# Patient Record
Sex: MALE | Race: WHITE | NOT HISPANIC OR LATINO
[De-identification: names, ages, dates, MRNs, and addresses within clinical notes are randomized per-mention and may not be internally consistent; named-entity substitution may affect disease eponyms.]

---

## 2017-03-07 ENCOUNTER — APPOINTMENT (OUTPATIENT)
Dept: OTOLARYNGOLOGY | Facility: CLINIC | Age: 82
End: 2017-03-07

## 2017-03-07 VITALS
HEART RATE: 88 BPM | SYSTOLIC BLOOD PRESSURE: 143 MMHG | WEIGHT: 169 LBS | BODY MASS INDEX: 26.53 KG/M2 | DIASTOLIC BLOOD PRESSURE: 87 MMHG | HEIGHT: 67 IN

## 2017-03-07 DIAGNOSIS — Z87.891 PERSONAL HISTORY OF NICOTINE DEPENDENCE: ICD-10-CM

## 2017-03-07 DIAGNOSIS — Z86.79 PERSONAL HISTORY OF OTHER DISEASES OF THE CIRCULATORY SYSTEM: ICD-10-CM

## 2017-03-07 RX ORDER — HYDROCHLOROTHIAZIDE 12.5 MG/1
12.5 CAPSULE ORAL
Refills: 0 | Status: ACTIVE | COMMUNITY

## 2017-03-07 RX ORDER — HYDROCHLOROTHIAZIDE 12.5 MG/1
12.5 CAPSULE ORAL
Qty: 90 | Refills: 0 | Status: DISCONTINUED | COMMUNITY
Start: 2016-07-29

## 2017-03-07 RX ORDER — SPIRONOLACTONE 25 MG/1
25 TABLET ORAL
Qty: 90 | Refills: 0 | Status: ACTIVE | COMMUNITY
Start: 2017-02-06

## 2017-03-16 ENCOUNTER — MESSAGE (OUTPATIENT)
Age: 82
End: 2017-03-16

## 2017-03-28 ENCOUNTER — OUTPATIENT (OUTPATIENT)
Dept: OUTPATIENT SERVICES | Facility: HOSPITAL | Age: 82
LOS: 1 days | End: 2017-03-28
Payer: MEDICARE

## 2017-03-28 VITALS
TEMPERATURE: 99 F | RESPIRATION RATE: 16 BRPM | SYSTOLIC BLOOD PRESSURE: 146 MMHG | HEIGHT: 64 IN | WEIGHT: 169.98 LBS | HEART RATE: 76 BPM | DIASTOLIC BLOOD PRESSURE: 70 MMHG

## 2017-03-28 DIAGNOSIS — Z90.49 ACQUIRED ABSENCE OF OTHER SPECIFIED PARTS OF DIGESTIVE TRACT: Chronic | ICD-10-CM

## 2017-03-28 DIAGNOSIS — C44.91 BASAL CELL CARCINOMA OF SKIN, UNSPECIFIED: ICD-10-CM

## 2017-03-28 DIAGNOSIS — C44.219 BASAL CELL CARCINOMA OF SKIN OF LEFT EAR AND EXTERNAL AURICULAR CANAL: ICD-10-CM

## 2017-03-28 DIAGNOSIS — I10 ESSENTIAL (PRIMARY) HYPERTENSION: ICD-10-CM

## 2017-03-28 LAB
BUN SERPL-MCNC: 23 MG/DL — SIGNIFICANT CHANGE UP (ref 7–23)
CALCIUM SERPL-MCNC: 9.7 MG/DL — SIGNIFICANT CHANGE UP (ref 8.4–10.5)
CHLORIDE SERPL-SCNC: 99 MMOL/L — SIGNIFICANT CHANGE UP (ref 98–107)
CO2 SERPL-SCNC: 29 MMOL/L — SIGNIFICANT CHANGE UP (ref 22–31)
CREAT SERPL-MCNC: 1.28 MG/DL — SIGNIFICANT CHANGE UP (ref 0.5–1.3)
GLUCOSE SERPL-MCNC: 82 MG/DL — SIGNIFICANT CHANGE UP (ref 70–99)
HCT VFR BLD CALC: 39.1 % — SIGNIFICANT CHANGE UP (ref 39–50)
HGB BLD-MCNC: 12.7 G/DL — LOW (ref 13–17)
MCHC RBC-ENTMCNC: 27.9 PG — SIGNIFICANT CHANGE UP (ref 27–34)
MCHC RBC-ENTMCNC: 32.5 % — SIGNIFICANT CHANGE UP (ref 32–36)
MCV RBC AUTO: 85.7 FL — SIGNIFICANT CHANGE UP (ref 80–100)
PLATELET # BLD AUTO: 198 K/UL — SIGNIFICANT CHANGE UP (ref 150–400)
PMV BLD: 10.6 FL — SIGNIFICANT CHANGE UP (ref 7–13)
POTASSIUM SERPL-MCNC: 4.5 MMOL/L — SIGNIFICANT CHANGE UP (ref 3.5–5.3)
POTASSIUM SERPL-SCNC: 4.5 MMOL/L — SIGNIFICANT CHANGE UP (ref 3.5–5.3)
RBC # BLD: 4.56 M/UL — SIGNIFICANT CHANGE UP (ref 4.2–5.8)
RBC # FLD: 17.1 % — HIGH (ref 10.3–14.5)
SODIUM SERPL-SCNC: 141 MMOL/L — SIGNIFICANT CHANGE UP (ref 135–145)
WBC # BLD: 5.01 K/UL — SIGNIFICANT CHANGE UP (ref 3.8–10.5)
WBC # FLD AUTO: 5.01 K/UL — SIGNIFICANT CHANGE UP (ref 3.8–10.5)

## 2017-03-28 PROCEDURE — 93010 ELECTROCARDIOGRAM REPORT: CPT

## 2017-03-28 NOTE — H&P PST ADULT - SKIN/BREAST COMMENTS
lesion noted to left ear c/o scabbed lesion to left ear, painful at times, now pain resolved, c/o itching

## 2017-03-28 NOTE — H&P PST ADULT - FAMILY HISTORY
Sibling  Still living? No  Family history of heart disease, Age at diagnosis: Age Unknown  Family history of cancer, Age at diagnosis: Age Unknown

## 2017-03-28 NOTE — H&P PST ADULT - PROBLEM SELECTOR PLAN 1
Pt. is scheduled for excision of left ear canal tumor with skin graft reconstruction, possible local flap for ear reconstruction on 4/5/17.  Preoperative instructions reviewed, pt. verbalized understanding.  Preop Famotidine provided.  Lab results pending, EKG done.  Pt. instructed to obtain medical clearance by surgeon, please obtain copy of clearance for PST chart Pt. is scheduled for excision of left ear canal tumor with skin graft reconstruction, possible local flap for ear reconstruction on 4/5/17.  Preoperative instructions reviewed, pt. verbalized understanding.  Preop Famotidine provided.  Lab results pending, EKG done.  Pt. instructed to obtain medical clearance by surgeon, please obtain copy of clearance for PST chart.  Please request copy of comparison EKG from PMD

## 2017-03-28 NOTE — H&P PST ADULT - PROBLEM SELECTOR PLAN 2
Pt. instructed to continue Pt. instructed to continue HCTZ and Spironolactone as directed, but to hold on the day of surgery

## 2017-03-28 NOTE — H&P PST ADULT - ASSESSMENT
85 y/o male recently diagnosed with basal cell carcinoma is scheduled for excision of left ear canal tumor with skin graft reconstruction, possible local flap for ear reconstruction on 4/5/17

## 2017-03-28 NOTE — H&P PST ADULT - NSANTHOSAYNRD_GEN_A_CORE
No. GRACIE screening performed.  STOP BANG Legend: 0-2 = LOW Risk; 3-4 = INTERMEDIATE Risk; 5-8 = HIGH Risk

## 2017-03-28 NOTE — H&P PST ADULT - ABILITY TO HEAR (WITH HEARING AID OR HEARING APPLIANCE IF NORMALLY USED):
Mildly to Moderately Impaired: difficulty hearing in some environments or speaker may need to increase volume or speak distinctly/B/L hearing loss

## 2017-03-28 NOTE — H&P PST ADULT - HISTORY OF PRESENT ILLNESS
85 y/o male recently diagnosed with skin cancer. 85 y/o male recently diagnosed with basal cell carcinoma in the left ear presents to PAST today for presurgical evaluation.  He complained of left ear pain for which he went to see his doctor.  He was referred to a dermatologist and biopsy confirmed basal cell carcinoma.  He is scheduled now for excision of left ear canal tumor with skin graft reconstruction, possible local flap for ear reconstruction on 4/5/17.

## 2017-03-28 NOTE — H&P PST ADULT - NEGATIVE NEUROLOGICAL SYMPTOMS
no paresthesias/no generalized seizures/no headache/no weakness/no difficulty walking/no syncope/no focal seizures

## 2017-03-28 NOTE — H&P PST ADULT - PRO PAIN LIFE ADAPT
inability to concentrate/inability to sleep/decreased appetite/decreased activity level/inability to work

## 2017-03-28 NOTE — H&P PST ADULT - PMH
Basal cell carcinoma    Colon cancer  Dx'd 2007, s/p colon resection and chemotherapy  Hypertension Basal cell carcinoma    Colon cancer  Dx'd 2007, s/p colon resection and chemotherapy  [place] (hard of hearing)    Hypertension

## 2017-04-03 ENCOUNTER — CXD DOCUMENT (OUTPATIENT)
Age: 82
End: 2017-04-03

## 2017-04-05 ENCOUNTER — APPOINTMENT (OUTPATIENT)
Dept: OTOLARYNGOLOGY | Facility: HOSPITAL | Age: 82
End: 2017-04-05

## 2017-07-25 ENCOUNTER — EMERGENCY (EMERGENCY)
Facility: HOSPITAL | Age: 82
LOS: 1 days | Discharge: HOME | End: 2017-07-25
Attending: EMERGENCY MEDICINE | Admitting: EMERGENCY MEDICINE
Payer: MEDICARE

## 2017-07-25 VITALS
TEMPERATURE: 98 F | DIASTOLIC BLOOD PRESSURE: 79 MMHG | SYSTOLIC BLOOD PRESSURE: 143 MMHG | OXYGEN SATURATION: 100 % | HEART RATE: 80 BPM | RESPIRATION RATE: 18 BRPM

## 2017-07-25 DIAGNOSIS — Z90.49 ACQUIRED ABSENCE OF OTHER SPECIFIED PARTS OF DIGESTIVE TRACT: Chronic | ICD-10-CM

## 2017-07-25 PROCEDURE — 99284 EMERGENCY DEPT VISIT MOD MDM: CPT

## 2017-07-25 RX ORDER — SPIRONOLACTONE 25 MG/1
1 TABLET, FILM COATED ORAL
Qty: 30 | Refills: 0 | OUTPATIENT
Start: 2017-07-25

## 2017-07-25 RX ORDER — SPIRONOLACTONE 25 MG/1
1 TABLET, FILM COATED ORAL
Qty: 0 | Refills: 0 | COMMUNITY

## 2017-07-25 NOTE — ED PROVIDER NOTE - MEDICAL DECISION MAKING DETAILS
Discussed at length. Appears he never saw PMD. Pt waited in waiting room 2 times and Rx was not phoned in. States taking  spironolactone for years. Denies HX of hyperkalemia. Will sent Rx for spironolactone once a night. Instructed to f/u with PMD.

## 2017-07-25 NOTE — ED ADULT TRIAGE NOTE - CHIEF COMPLAINT QUOTE
Pt states he has been unable to have doctor refill his Spironolactone 25mg prescription. States he went to his doctor's office 2 times last week and asked nurse to have doctor refill the prescription but doctor has not done it. Last took medication today and now has no more left.

## 2017-07-25 NOTE — ED PROVIDER NOTE - PMH
Basal cell carcinoma    Colon cancer  Dx'd 2007, s/p colon resection and chemotherapy  [place] (hard of hearing)    Hypertension

## 2017-07-25 NOTE — ED PROVIDER NOTE - OBJECTIVE STATEMENT
84 y/o male with PMHx of basal cell carcinoma, colon cancer, hard of hearing, and HTN, presents to the ED for spironolactone refill. Pt went to the PMD, twice, for a medication refill, but was not able to see the doctor in person. Reports requesting the medication refill to be phones in, but it was not done. Presents to the ED for refill of the medication due to dissatisfaction with the PMD. Denies fever, chills, and any other complaints.

## 2017-09-25 ENCOUNTER — APPOINTMENT (OUTPATIENT)
Dept: UROLOGY | Facility: CLINIC | Age: 82
End: 2017-09-25
Payer: MEDICARE

## 2017-09-25 VITALS
SYSTOLIC BLOOD PRESSURE: 140 MMHG | OXYGEN SATURATION: 97 % | DIASTOLIC BLOOD PRESSURE: 70 MMHG | HEIGHT: 67 IN | WEIGHT: 172 LBS | BODY MASS INDEX: 27 KG/M2 | HEART RATE: 97 BPM

## 2017-09-25 DIAGNOSIS — N20.0 CALCULUS OF KIDNEY: ICD-10-CM

## 2017-09-25 PROCEDURE — 99213 OFFICE O/P EST LOW 20 MIN: CPT

## 2017-09-25 RX ORDER — ACETAMINOPHEN 325 MG/1
TABLET, FILM COATED ORAL
Refills: 0 | Status: ACTIVE | COMMUNITY

## 2017-09-27 LAB
BACTERIA UR CULT: NORMAL
CORE LAB FLUID CYTOLOGY: NORMAL

## 2017-09-28 ENCOUNTER — FORM ENCOUNTER (OUTPATIENT)
Age: 82
End: 2017-09-28

## 2017-09-29 ENCOUNTER — OUTPATIENT (OUTPATIENT)
Dept: OUTPATIENT SERVICES | Facility: HOSPITAL | Age: 82
LOS: 1 days | End: 2017-09-29
Payer: COMMERCIAL

## 2017-09-29 ENCOUNTER — APPOINTMENT (OUTPATIENT)
Dept: CT IMAGING | Facility: CLINIC | Age: 82
End: 2017-09-29
Payer: MEDICARE

## 2017-09-29 DIAGNOSIS — Z00.8 ENCOUNTER FOR OTHER GENERAL EXAMINATION: ICD-10-CM

## 2017-09-29 DIAGNOSIS — Z90.49 ACQUIRED ABSENCE OF OTHER SPECIFIED PARTS OF DIGESTIVE TRACT: Chronic | ICD-10-CM

## 2017-09-29 PROCEDURE — 74178 CT ABD&PLV WO CNTR FLWD CNTR: CPT | Mod: 26

## 2017-09-29 PROCEDURE — 74178 CT ABD&PLV WO CNTR FLWD CNTR: CPT

## 2017-09-29 PROCEDURE — 82565 ASSAY OF CREATININE: CPT

## 2017-10-05 ENCOUNTER — APPOINTMENT (OUTPATIENT)
Dept: UROLOGY | Facility: CLINIC | Age: 82
End: 2017-10-05
Payer: MEDICARE

## 2017-10-05 VITALS
BODY MASS INDEX: 27 KG/M2 | HEIGHT: 67 IN | TEMPERATURE: 98.7 F | WEIGHT: 172 LBS | SYSTOLIC BLOOD PRESSURE: 120 MMHG | HEART RATE: 95 BPM | OXYGEN SATURATION: 97 % | DIASTOLIC BLOOD PRESSURE: 70 MMHG

## 2017-10-05 PROCEDURE — 52000 CYSTOURETHROSCOPY: CPT

## 2017-10-20 ENCOUNTER — OUTPATIENT (OUTPATIENT)
Dept: OUTPATIENT SERVICES | Facility: HOSPITAL | Age: 82
LOS: 1 days | End: 2017-10-20
Payer: COMMERCIAL

## 2017-10-20 VITALS
OXYGEN SATURATION: 99 % | HEIGHT: 67 IN | DIASTOLIC BLOOD PRESSURE: 81 MMHG | TEMPERATURE: 98 F | SYSTOLIC BLOOD PRESSURE: 158 MMHG | WEIGHT: 167.99 LBS | HEART RATE: 71 BPM | RESPIRATION RATE: 16 BRPM

## 2017-10-20 DIAGNOSIS — Z90.49 ACQUIRED ABSENCE OF OTHER SPECIFIED PARTS OF DIGESTIVE TRACT: Chronic | ICD-10-CM

## 2017-10-20 DIAGNOSIS — D49.4 NEOPLASM OF UNSPECIFIED BEHAVIOR OF BLADDER: ICD-10-CM

## 2017-10-20 DIAGNOSIS — Z01.818 ENCOUNTER FOR OTHER PREPROCEDURAL EXAMINATION: ICD-10-CM

## 2017-10-20 DIAGNOSIS — R31.0 GROSS HEMATURIA: ICD-10-CM

## 2017-10-20 LAB
ANION GAP SERPL CALC-SCNC: 18 MMOL/L — HIGH (ref 5–17)
BUN SERPL-MCNC: 22 MG/DL — SIGNIFICANT CHANGE UP (ref 7–23)
CALCIUM SERPL-MCNC: 10.8 MG/DL — HIGH (ref 8.4–10.5)
CHLORIDE SERPL-SCNC: 104 MMOL/L — SIGNIFICANT CHANGE UP (ref 96–108)
CO2 SERPL-SCNC: 23 MMOL/L — SIGNIFICANT CHANGE UP (ref 22–31)
CREAT SERPL-MCNC: 1.41 MG/DL — HIGH (ref 0.5–1.3)
GLUCOSE SERPL-MCNC: 103 MG/DL — HIGH (ref 70–99)
HCT VFR BLD CALC: 37.4 % — LOW (ref 39–50)
HGB BLD-MCNC: 12.4 G/DL — LOW (ref 13–17)
MCHC RBC-ENTMCNC: 28.4 PG — SIGNIFICANT CHANGE UP (ref 27–34)
MCHC RBC-ENTMCNC: 33.2 GM/DL — SIGNIFICANT CHANGE UP (ref 32–36)
MCV RBC AUTO: 85.6 FL — SIGNIFICANT CHANGE UP (ref 80–100)
PLATELET # BLD AUTO: 183 K/UL — SIGNIFICANT CHANGE UP (ref 150–400)
POTASSIUM SERPL-MCNC: 4.4 MMOL/L — SIGNIFICANT CHANGE UP (ref 3.5–5.3)
POTASSIUM SERPL-SCNC: 4.4 MMOL/L — SIGNIFICANT CHANGE UP (ref 3.5–5.3)
RBC # BLD: 4.37 M/UL — SIGNIFICANT CHANGE UP (ref 4.2–5.8)
RBC # FLD: 16.4 % — HIGH (ref 10.3–14.5)
SODIUM SERPL-SCNC: 145 MMOL/L — SIGNIFICANT CHANGE UP (ref 135–145)
WBC # BLD: 5.45 K/UL — SIGNIFICANT CHANGE UP (ref 3.8–10.5)
WBC # FLD AUTO: 5.45 K/UL — SIGNIFICANT CHANGE UP (ref 3.8–10.5)

## 2017-10-20 RX ORDER — CEFAZOLIN SODIUM 1 G
2000 VIAL (EA) INJECTION ONCE
Qty: 0 | Refills: 0 | Status: DISCONTINUED | OUTPATIENT
Start: 2017-10-24 | End: 2017-10-25

## 2017-10-20 RX ORDER — GARLIC 1000 MG
1 CAPSULE ORAL
Qty: 0 | Refills: 0 | COMMUNITY

## 2017-10-20 NOTE — H&P PST ADULT - MUSCULOSKELETAL
details… detailed exam ROM intact/normal strength no joint erythema/no joint swelling/no calf tenderness/no joint warmth

## 2017-10-20 NOTE — H&P PST ADULT - NEGATIVE NEUROLOGICAL SYMPTOMS
no weakness/no paresthesias/no generalized seizures/no syncope/no focal seizures/no headache/no difficulty walking

## 2017-10-20 NOTE — H&P PST ADULT - NEUROLOGICAL DETAILS
alert and oriented x 3/sensation intact/normal strength/responds to verbal commands no spontaneous movement/alert and oriented x 3/normal strength

## 2017-10-20 NOTE — H&P PST ADULT - HISTORY OF PRESENT ILLNESS
85 y/o male recently diagnosed with basal cell carcinoma in the left ear presents to PAST today for presurgical evaluation.  He complained of left ear pain for which he went to see his doctor.  He was referred to a dermatologist and biopsy confirmed basal cell carcinoma.  He is scheduled now for excision of left ear canal tumor with skin graft reconstruction, possible local flap for ear reconstruction on 4/5/17. 85 year old male with PMH of HTN, colon cancer s/p resection, basal cell carcinoma in the left ear with complaint of hematuria planned for TURBT - Bipolar, left flexible ureteroscopy, biopsy.     basal cell carcinoma in the left ear: had planned for excision of left ear canal tumor 4/2017, However surgery was cancelled by patient.

## 2017-10-20 NOTE — H&P PST ADULT - CONSTITUTIONAL DETAILS
no distress/well-groomed/well-nourished/well-developed no distress/well-nourished/well-developed/well-groomed/[place]

## 2017-10-20 NOTE — H&P PST ADULT - PROBLEM SELECTOR PLAN 1
planned for TURBT - Bipolar, left flexible ureteroscopy, biopsy.   PST labs send  preprocedure instructions discussed

## 2017-10-20 NOTE — H&P PST ADULT - SKIN/BREAST COMMENTS
c/o scabbed lesion to left ear, painful at times, now pain resolved with scabbed lesion to left ear.

## 2017-10-20 NOTE — H&P PST ADULT - PMH
Basal cell carcinoma    Colon cancer  Dx'd 2007, s/p colon resection and chemotherapy  [place] (hard of hearing)    Hypertension Basal cell carcinoma    Bladder tumor    Colon cancer  Dx'd 2007, s/p colon resection and chemotherapy  Hematuria    [place] (hard of hearing)    Hypertension

## 2017-10-24 ENCOUNTER — INPATIENT (INPATIENT)
Facility: HOSPITAL | Age: 82
LOS: 0 days | Discharge: LEFT BEFORE DISCHARGE | DRG: 669 | End: 2017-10-25
Attending: UROLOGY | Admitting: UROLOGY
Payer: COMMERCIAL

## 2017-10-24 ENCOUNTER — APPOINTMENT (OUTPATIENT)
Dept: UROLOGY | Facility: AMBULATORY SURGERY CENTER | Age: 82
End: 2017-10-24

## 2017-10-24 ENCOUNTER — TRANSCRIPTION ENCOUNTER (OUTPATIENT)
Age: 82
End: 2017-10-24

## 2017-10-24 ENCOUNTER — RESULT REVIEW (OUTPATIENT)
Age: 82
End: 2017-10-24

## 2017-10-24 VITALS
HEIGHT: 67 IN | WEIGHT: 167.99 LBS | DIASTOLIC BLOOD PRESSURE: 80 MMHG | SYSTOLIC BLOOD PRESSURE: 146 MMHG | HEART RATE: 81 BPM | OXYGEN SATURATION: 95 % | TEMPERATURE: 98 F | RESPIRATION RATE: 18 BRPM

## 2017-10-24 DIAGNOSIS — R31.0 GROSS HEMATURIA: ICD-10-CM

## 2017-10-24 DIAGNOSIS — Z90.49 ACQUIRED ABSENCE OF OTHER SPECIFIED PARTS OF DIGESTIVE TRACT: Chronic | ICD-10-CM

## 2017-10-24 LAB
ANION GAP SERPL CALC-SCNC: 13 MMOL/L — SIGNIFICANT CHANGE UP (ref 5–17)
BLD GP AB SCN SERPL QL: NEGATIVE — SIGNIFICANT CHANGE UP
BUN SERPL-MCNC: 28 MG/DL — HIGH (ref 7–23)
CALCIUM SERPL-MCNC: 8.7 MG/DL — SIGNIFICANT CHANGE UP (ref 8.4–10.5)
CHLORIDE SERPL-SCNC: 106 MMOL/L — SIGNIFICANT CHANGE UP (ref 96–108)
CO2 SERPL-SCNC: 24 MMOL/L — SIGNIFICANT CHANGE UP (ref 22–31)
CREAT SERPL-MCNC: 1.41 MG/DL — HIGH (ref 0.5–1.3)
GLUCOSE SERPL-MCNC: 111 MG/DL — HIGH (ref 70–99)
HCT VFR BLD CALC: 37 % — LOW (ref 39–50)
HGB BLD-MCNC: 12 G/DL — LOW (ref 13–17)
MAGNESIUM SERPL-MCNC: 1.9 MG/DL — SIGNIFICANT CHANGE UP (ref 1.6–2.6)
MCHC RBC-ENTMCNC: 28.9 PG — SIGNIFICANT CHANGE UP (ref 27–34)
MCHC RBC-ENTMCNC: 32.6 GM/DL — SIGNIFICANT CHANGE UP (ref 32–36)
MCV RBC AUTO: 88.9 FL — SIGNIFICANT CHANGE UP (ref 80–100)
PHOSPHATE SERPL-MCNC: 3.6 MG/DL — SIGNIFICANT CHANGE UP (ref 2.5–4.5)
PLATELET # BLD AUTO: 158 K/UL — SIGNIFICANT CHANGE UP (ref 150–400)
POTASSIUM SERPL-MCNC: 4.3 MMOL/L — SIGNIFICANT CHANGE UP (ref 3.5–5.3)
POTASSIUM SERPL-SCNC: 4.3 MMOL/L — SIGNIFICANT CHANGE UP (ref 3.5–5.3)
RBC # BLD: 4.16 M/UL — LOW (ref 4.2–5.8)
RBC # FLD: 15.4 % — HIGH (ref 10.3–14.5)
RH IG SCN BLD-IMP: POSITIVE — SIGNIFICANT CHANGE UP
SODIUM SERPL-SCNC: 143 MMOL/L — SIGNIFICANT CHANGE UP (ref 135–145)
TROPONIN T SERPL-MCNC: <0.01 NG/ML — SIGNIFICANT CHANGE UP (ref 0–0.06)
WBC # BLD: 4.3 K/UL — SIGNIFICANT CHANGE UP (ref 3.8–10.5)
WBC # FLD AUTO: 4.3 K/UL — SIGNIFICANT CHANGE UP (ref 3.8–10.5)

## 2017-10-24 PROCEDURE — 85027 COMPLETE CBC AUTOMATED: CPT

## 2017-10-24 PROCEDURE — 88307 TISSUE EXAM BY PATHOLOGIST: CPT | Mod: 26

## 2017-10-24 PROCEDURE — G0463: CPT

## 2017-10-24 PROCEDURE — 80048 BASIC METABOLIC PNL TOTAL CA: CPT

## 2017-10-24 PROCEDURE — 93010 ELECTROCARDIOGRAM REPORT: CPT

## 2017-10-24 PROCEDURE — 76000 FLUOROSCOPY <1 HR PHYS/QHP: CPT

## 2017-10-24 RX ORDER — SENNA PLUS 8.6 MG/1
1 TABLET ORAL AT BEDTIME
Qty: 0 | Refills: 0 | Status: DISCONTINUED | OUTPATIENT
Start: 2017-10-24 | End: 2017-10-25

## 2017-10-24 RX ORDER — OXYCODONE AND ACETAMINOPHEN 5; 325 MG/1; MG/1
2 TABLET ORAL EVERY 6 HOURS
Qty: 0 | Refills: 0 | Status: DISCONTINUED | OUTPATIENT
Start: 2017-10-24 | End: 2017-10-25

## 2017-10-24 RX ORDER — SPIRONOLACTONE 25 MG/1
25 TABLET, FILM COATED ORAL DAILY
Qty: 0 | Refills: 0 | Status: DISCONTINUED | OUTPATIENT
Start: 2017-10-24 | End: 2017-10-25

## 2017-10-24 RX ORDER — HYDROMORPHONE HYDROCHLORIDE 2 MG/ML
0.2 INJECTION INTRAMUSCULAR; INTRAVENOUS; SUBCUTANEOUS
Qty: 0 | Refills: 0 | Status: DISCONTINUED | OUTPATIENT
Start: 2017-10-24 | End: 2017-10-25

## 2017-10-24 RX ORDER — ACETAMINOPHEN 500 MG
650 TABLET ORAL EVERY 6 HOURS
Qty: 0 | Refills: 0 | Status: DISCONTINUED | OUTPATIENT
Start: 2017-10-24 | End: 2017-10-25

## 2017-10-24 RX ORDER — ONDANSETRON 8 MG/1
4 TABLET, FILM COATED ORAL ONCE
Qty: 0 | Refills: 0 | Status: DISCONTINUED | OUTPATIENT
Start: 2017-10-24 | End: 2017-10-25

## 2017-10-24 RX ORDER — SODIUM CHLORIDE 9 MG/ML
3 INJECTION INTRAMUSCULAR; INTRAVENOUS; SUBCUTANEOUS EVERY 8 HOURS
Qty: 0 | Refills: 0 | Status: DISCONTINUED | OUTPATIENT
Start: 2017-10-24 | End: 2017-10-24

## 2017-10-24 RX ORDER — LIDOCAINE 4 G/100G
1 CREAM TOPICAL EVERY 4 HOURS
Qty: 0 | Refills: 0 | Status: DISCONTINUED | OUTPATIENT
Start: 2017-10-24 | End: 2017-10-25

## 2017-10-24 RX ORDER — HEPARIN SODIUM 5000 [USP'U]/ML
5000 INJECTION INTRAVENOUS; SUBCUTANEOUS EVERY 8 HOURS
Qty: 0 | Refills: 0 | Status: DISCONTINUED | OUTPATIENT
Start: 2017-10-24 | End: 2017-10-25

## 2017-10-24 RX ORDER — OXYCODONE AND ACETAMINOPHEN 5; 325 MG/1; MG/1
1 TABLET ORAL EVERY 4 HOURS
Qty: 0 | Refills: 0 | Status: DISCONTINUED | OUTPATIENT
Start: 2017-10-24 | End: 2017-10-25

## 2017-10-24 RX ORDER — DOCUSATE SODIUM 100 MG
100 CAPSULE ORAL THREE TIMES A DAY
Qty: 0 | Refills: 0 | Status: DISCONTINUED | OUTPATIENT
Start: 2017-10-24 | End: 2017-10-25

## 2017-10-24 RX ORDER — LIDOCAINE HCL 20 MG/ML
0.2 VIAL (ML) INJECTION ONCE
Qty: 0 | Refills: 0 | Status: DISCONTINUED | OUTPATIENT
Start: 2017-10-24 | End: 2017-10-24

## 2017-10-24 RX ORDER — SODIUM CHLORIDE 9 MG/ML
1000 INJECTION, SOLUTION INTRAVENOUS
Qty: 0 | Refills: 0 | Status: DISCONTINUED | OUTPATIENT
Start: 2017-10-24 | End: 2017-10-25

## 2017-10-24 RX ORDER — CEFAZOLIN SODIUM 1 G
2000 VIAL (EA) INJECTION EVERY 8 HOURS
Qty: 0 | Refills: 0 | Status: DISCONTINUED | OUTPATIENT
Start: 2017-10-24 | End: 2017-10-25

## 2017-10-24 RX ADMIN — OXYCODONE AND ACETAMINOPHEN 1 TABLET(S): 5; 325 TABLET ORAL at 21:07

## 2017-10-24 RX ADMIN — OXYCODONE AND ACETAMINOPHEN 1 TABLET(S): 5; 325 TABLET ORAL at 21:35

## 2017-10-24 RX ADMIN — Medication 100 MILLIGRAM(S): at 21:08

## 2017-10-24 RX ADMIN — SENNA PLUS 1 TABLET(S): 8.6 TABLET ORAL at 21:06

## 2017-10-24 RX ADMIN — Medication 100 MILLIGRAM(S): at 21:06

## 2017-10-24 NOTE — PROGRESS NOTE ADULT - SUBJECTIVE AND OBJECTIVE BOX
Post op Check    Pt seen and examined resting at bedside without complaints. Pending Cardiac workup for SVT intraoperatively, which reversed when patient was reversed from anesthesia. Pain is controlled. Denies SOB/CP/N/V.     Vital Signs Last 24 Hrs  T(C): 36.4 (24 Oct 2017 18:50), Max: 36.6 (24 Oct 2017 12:30)  T(F): 97.5 (24 Oct 2017 18:50), Max: 97.9 (24 Oct 2017 12:30)  HR: 77 (24 Oct 2017 20:30) (71 - 81)  BP: 169/82 (24 Oct 2017 20:30) (133/69 - 169/82)  BP(mean): 118 (24 Oct 2017 20:30) (109 - 118)  RR: 15 (24 Oct 2017 20:30) (15 - 18)  SpO2: 97% (24 Oct 2017 20:30) (95% - 98%)    I&O's Summary    24 Oct 2017 07:01  -  24 Oct 2017 21:42  --------------------------------------------------------  IN: 75 mL / OUT: 0 mL / NET: 75 mL        Physical Exam  Gen: NAD, A&O x 3  Abd: Soft, NT, ND  Back: no CVA tenderness b/l  : +CBI draining clear red                           12.0   4.3   )-----------( 158      ( 24 Oct 2017 19:14 )             37.0       10-24    143  |  106  |  28<H>  ----------------------------<  111<H>  4.3   |  24  |  1.41<H>    Ca    8.7      24 Oct 2017 19:14  Phos  3.6     10-24  Mg     1.9     10-24

## 2017-10-24 NOTE — PROGRESS NOTE ADULT - ASSESSMENT
A/P: 85y Male s/p cystoscopy, TURBT  -DVT prophylaxis/OOB  -Incentive spirometry  -Strict I&O's  -Analgesia and antiemetics as needed  -Diet  -AM labs  - cbc, bmp, troponin, ekg  - tele monitoring  - Cardiology consult

## 2017-10-24 NOTE — BRIEF OPERATIVE NOTE - PROCEDURE
Retrograde pyelogram  10/24/2017    Active  ANG5  Cystoscopy, with TURBT  10/24/2017    Active  ANG5 <<-----Click on this checkbox to enter Procedure

## 2017-10-25 ENCOUNTER — TRANSCRIPTION ENCOUNTER (OUTPATIENT)
Age: 82
End: 2017-10-25

## 2017-10-25 VITALS
RESPIRATION RATE: 18 BRPM | DIASTOLIC BLOOD PRESSURE: 76 MMHG | OXYGEN SATURATION: 94 % | TEMPERATURE: 99 F | SYSTOLIC BLOOD PRESSURE: 168 MMHG | HEART RATE: 81 BPM

## 2017-10-25 LAB
ANION GAP SERPL CALC-SCNC: 14 MMOL/L — SIGNIFICANT CHANGE UP (ref 5–17)
BUN SERPL-MCNC: 26 MG/DL — HIGH (ref 7–23)
CALCIUM SERPL-MCNC: 8.5 MG/DL — SIGNIFICANT CHANGE UP (ref 8.4–10.5)
CHLORIDE SERPL-SCNC: 103 MMOL/L — SIGNIFICANT CHANGE UP (ref 96–108)
CO2 SERPL-SCNC: 23 MMOL/L — SIGNIFICANT CHANGE UP (ref 22–31)
CREAT SERPL-MCNC: 1.32 MG/DL — HIGH (ref 0.5–1.3)
GLUCOSE SERPL-MCNC: 126 MG/DL — HIGH (ref 70–99)
HCT VFR BLD CALC: 35 % — LOW (ref 39–50)
HGB BLD-MCNC: 12 G/DL — LOW (ref 13–17)
MCHC RBC-ENTMCNC: 30 PG — SIGNIFICANT CHANGE UP (ref 27–34)
MCHC RBC-ENTMCNC: 34.4 GM/DL — SIGNIFICANT CHANGE UP (ref 32–36)
MCV RBC AUTO: 87.3 FL — SIGNIFICANT CHANGE UP (ref 80–100)
PLATELET # BLD AUTO: 162 K/UL — SIGNIFICANT CHANGE UP (ref 150–400)
POTASSIUM SERPL-MCNC: 4.1 MMOL/L — SIGNIFICANT CHANGE UP (ref 3.5–5.3)
POTASSIUM SERPL-SCNC: 4.1 MMOL/L — SIGNIFICANT CHANGE UP (ref 3.5–5.3)
RBC # BLD: 4.01 M/UL — LOW (ref 4.2–5.8)
RBC # FLD: 15.2 % — HIGH (ref 10.3–14.5)
SODIUM SERPL-SCNC: 140 MMOL/L — SIGNIFICANT CHANGE UP (ref 135–145)
TROPONIN T SERPL-MCNC: <0.01 NG/ML — SIGNIFICANT CHANGE UP (ref 0–0.06)
WBC # BLD: 7.4 K/UL — SIGNIFICANT CHANGE UP (ref 3.8–10.5)
WBC # FLD AUTO: 7.4 K/UL — SIGNIFICANT CHANGE UP (ref 3.8–10.5)

## 2017-10-25 RX ORDER — CEPHALEXIN 500 MG
1 CAPSULE ORAL
Qty: 9 | Refills: 0 | OUTPATIENT
Start: 2017-10-25 | End: 2017-10-28

## 2017-10-25 RX ORDER — ACETAMINOPHEN 500 MG
1 TABLET ORAL
Qty: 0 | Refills: 0 | COMMUNITY

## 2017-10-25 RX ORDER — SENNA PLUS 8.6 MG/1
1 TABLET ORAL
Qty: 0 | Refills: 0 | COMMUNITY
Start: 2017-10-25

## 2017-10-25 RX ORDER — DOCUSATE SODIUM 100 MG
1 CAPSULE ORAL
Qty: 0 | Refills: 0 | COMMUNITY
Start: 2017-10-25

## 2017-10-25 RX ADMIN — OXYCODONE AND ACETAMINOPHEN 2 TABLET(S): 5; 325 TABLET ORAL at 20:44

## 2017-10-25 RX ADMIN — Medication 100 MILLIGRAM(S): at 14:24

## 2017-10-25 RX ADMIN — Medication 100 MILLIGRAM(S): at 06:16

## 2017-10-25 RX ADMIN — OXYCODONE AND ACETAMINOPHEN 1 TABLET(S): 5; 325 TABLET ORAL at 02:00

## 2017-10-25 RX ADMIN — OXYCODONE AND ACETAMINOPHEN 1 TABLET(S): 5; 325 TABLET ORAL at 01:15

## 2017-10-25 RX ADMIN — HEPARIN SODIUM 5000 UNIT(S): 5000 INJECTION INTRAVENOUS; SUBCUTANEOUS at 06:19

## 2017-10-25 RX ADMIN — SODIUM CHLORIDE 75 MILLILITER(S): 9 INJECTION, SOLUTION INTRAVENOUS at 09:29

## 2017-10-25 RX ADMIN — SPIRONOLACTONE 25 MILLIGRAM(S): 25 TABLET, FILM COATED ORAL at 06:19

## 2017-10-25 NOTE — PROGRESS NOTE ADULT - SUBJECTIVE AND OBJECTIVE BOX
UROLOGY DAILY PROGRESS NOTE:     Subjective: Patient seen and examined at bedside. No overnight events. Denies chest pain or SOB.       Objective:  Vital signs  T(F): , Max: 97.9 (10-24-17 @ 12:30)  HR: 62 (10-25-17 @ 06:00)  BP: 113/58 (10-25-17 @ 06:00)  SpO2: 100% (10-25-17 @ 06:00)  Wt(kg): --    I&O's Summary    24 Oct 2017 07:01  -  25 Oct 2017 07:00  --------------------------------------------------------  IN: 1250 mL / OUT: 1060 mL / NET: 190 mL    25 Oct 2017 07:01  -  25 Oct 2017 07:20  --------------------------------------------------------  IN: 75 mL / OUT: 100 mL / NET: -25 mL        Gen: NAD  Pulm: No respiratory distress, no subcostal retractions  CV: RRR, no JVD  Abd: Soft, NT, ND  : Tena- clear yellow urine    Labs:  10-25  7.4   / 35.0  /1.32   10-24  4.3   / 37.0  /1.41                           12.0   7.4   )-----------( 162      ( 25 Oct 2017 01:23 )             35.0     10-25    140  |  103  |  26<H>  ----------------------------<  126<H>  4.1   |  23  |  1.32<H>    Ca    8.5      25 Oct 2017 01:23  Phos  3.6     10-24  Mg     1.9     10-24

## 2017-10-25 NOTE — DISCHARGE NOTE ADULT - CONDITIONS AT DISCHARGE
Patient vital signs stable, signing out of hospital against medical advice, pt and pt son (HCP) verbalize understanding risks of leaving AMA.

## 2017-10-25 NOTE — PROVIDER CONTACT NOTE (OTHER) - SITUATION
patient received from PACU A&Ox2 s/p TURP today. patient very agitated and pulling at zimmerman, IVL, and heart monitor. patient was on constant observation in PACU for a short time

## 2017-10-25 NOTE — DISCHARGE NOTE ADULT - HOSPITAL COURSE
86 yo male admitted 10/24 s/p scheduled TURBT. Intraop had SVT, post op stable and assymptomatic and no abnormalities on monitor. cardiac enzymes x2 negative, electrolytes WNL. Dr. Aranda saw patient and recommended hard telemetry monitoring for 24hours and ECHO. Pt is refusing to stay and wants to leave AMA. patient advised to stay, Risks explained to patient and son up to and including death. patient and son signed AMA paperwork, will go home with elsie butler.

## 2017-10-25 NOTE — DISCHARGE NOTE ADULT - ADDITIONAL INSTRUCTIONS
Home with elsie to [place], follow up with Dr. Man as outpatient for zimmerman removal, call for appt.     Follow up with cardio as outpatient, call for appt. ECHO as outpatient

## 2017-10-25 NOTE — ANESTHESIA FOLLOW-UP NOTE - NSINTERVIEW_GEN_ALL_CORE
Received fax from pharmacy that insurance not covering diltiazem; prefers amlodipine or verapamil; please advise   Interviewed and evaluated

## 2017-10-25 NOTE — PROGRESS NOTE ADULT - ASSESSMENT
s/p TURBT, SVTs intra op, neg CE  - Cardio- [facility] following  - Telemetry x 24h  - Cont zimmerman catheter  - OOB  - Reg diet

## 2017-10-25 NOTE — DISCHARGE NOTE ADULT - PLAN OF CARE
pain control Call the office if you have fever greater than 101, difficulty urinating, pain not relieved with pain medication, nausea/vomiting. Home with elsie butler. continue home medication Follow up with cardiology for full work up including echocardiogram.   Information for Dr. Aranda provided

## 2017-10-25 NOTE — DISCHARGE NOTE ADULT - CARE PROVIDER_API CALL
Ashutosh Man), Urology  [address]  [address]  [place] [address]  Phone: [phone]  Fax: [phone]    Ashutosh Aranda), Cardiovascular Disease; Internal Medicine  [address] [address]  Phone: [phone]  Fax: [phone]

## 2017-10-25 NOTE — DISCHARGE NOTE ADULT - MEDICATION SUMMARY - MEDICATIONS TO TAKE
I will START or STAY ON the medications listed below when I get home from the hospital:    spironolactone 25 mg oral tablet  -- 1 tab(s) by mouth once a day  -- Indication: For home medication    Keflex 500 mg oral capsule  -- 1 cap(s) by mouth 3 times a day   -- Finish all this medication unless otherwise directed by prescriber.    -- Indication: For antibiotic    docusate sodium 100 mg oral capsule  -- 1 cap(s) by mouth 3 times a day  -- Indication: For constipation    senna oral tablet  -- 1 tab(s) by mouth once a day (at bedtime)  -- Indication: For constipation

## 2017-10-25 NOTE — PROVIDER CONTACT NOTE (OTHER) - ASSESSMENT
VSS. Oriented to place and self. Agitated and pulling at lines. Screaming "I want to go back, get me out of here"

## 2017-10-27 ENCOUNTER — MEDICATION RENEWAL (OUTPATIENT)
Age: 82
End: 2017-10-27

## 2017-10-27 ENCOUNTER — APPOINTMENT (OUTPATIENT)
Dept: UROLOGY | Facility: CLINIC | Age: 82
End: 2017-10-27

## 2017-10-27 LAB — SURGICAL PATHOLOGY STUDY: SIGNIFICANT CHANGE UP

## 2017-10-30 ENCOUNTER — APPOINTMENT (OUTPATIENT)
Dept: UROLOGY | Facility: CLINIC | Age: 82
End: 2017-10-30
Payer: MEDICARE

## 2017-10-30 VITALS — DIASTOLIC BLOOD PRESSURE: 70 MMHG | SYSTOLIC BLOOD PRESSURE: 120 MMHG | HEART RATE: 73 BPM | OXYGEN SATURATION: 95 %

## 2017-10-30 DIAGNOSIS — Z87.448 PERSONAL HISTORY OF OTHER DISEASES OF URINARY SYSTEM: ICD-10-CM

## 2017-10-30 DIAGNOSIS — C67.2 MALIGNANT NEOPLASM OF LATERAL WALL OF BLADDER: ICD-10-CM

## 2017-10-30 PROCEDURE — 99214 OFFICE O/P EST MOD 30 MIN: CPT

## 2017-12-19 PROCEDURE — 88307 TISSUE EXAM BY PATHOLOGIST: CPT

## 2017-12-19 PROCEDURE — C1758: CPT

## 2017-12-19 PROCEDURE — 84484 ASSAY OF TROPONIN QUANT: CPT

## 2017-12-19 PROCEDURE — 86901 BLOOD TYPING SEROLOGIC RH(D): CPT

## 2017-12-19 PROCEDURE — 83735 ASSAY OF MAGNESIUM: CPT

## 2017-12-19 PROCEDURE — C1769: CPT

## 2017-12-19 PROCEDURE — 93005 ELECTROCARDIOGRAM TRACING: CPT

## 2017-12-19 PROCEDURE — 86900 BLOOD TYPING SEROLOGIC ABO: CPT

## 2017-12-19 PROCEDURE — 86850 RBC ANTIBODY SCREEN: CPT

## 2017-12-19 PROCEDURE — 84100 ASSAY OF PHOSPHORUS: CPT

## 2017-12-19 PROCEDURE — 80048 BASIC METABOLIC PNL TOTAL CA: CPT

## 2017-12-19 PROCEDURE — 85027 COMPLETE CBC AUTOMATED: CPT

## 2018-01-22 ENCOUNTER — APPOINTMENT (OUTPATIENT)
Dept: UROLOGY | Facility: CLINIC | Age: 83
End: 2018-01-22

## 2018-05-15 ENCOUNTER — APPOINTMENT (OUTPATIENT)
Dept: OTOLARYNGOLOGY | Facility: CLINIC | Age: 83
End: 2018-05-15
Payer: MEDICARE

## 2018-05-15 VITALS
SYSTOLIC BLOOD PRESSURE: 158 MMHG | HEART RATE: 79 BPM | DIASTOLIC BLOOD PRESSURE: 74 MMHG | RESPIRATION RATE: 18 BRPM | WEIGHT: 168 LBS | BODY MASS INDEX: 26.37 KG/M2 | HEIGHT: 67 IN

## 2018-05-15 PROCEDURE — 99213 OFFICE O/P EST LOW 20 MIN: CPT | Mod: 25

## 2018-05-15 PROCEDURE — 69210 REMOVE IMPACTED EAR WAX UNI: CPT

## 2018-05-22 ENCOUNTER — MESSAGE (OUTPATIENT)
Age: 83
End: 2018-05-22

## 2018-06-05 ENCOUNTER — APPOINTMENT (OUTPATIENT)
Dept: OTOLARYNGOLOGY | Facility: CLINIC | Age: 83
End: 2018-06-05
Payer: MEDICARE

## 2018-06-05 VITALS
WEIGHT: 168 LBS | SYSTOLIC BLOOD PRESSURE: 146 MMHG | HEART RATE: 87 BPM | BODY MASS INDEX: 26.37 KG/M2 | RESPIRATION RATE: 18 BRPM | DIASTOLIC BLOOD PRESSURE: 78 MMHG | HEIGHT: 67 IN

## 2018-06-05 DIAGNOSIS — Z09 ENCOUNTER FOR FOLLOW-UP EXAMINATION AFTER COMPLETED TREATMENT FOR CONDITIONS OTHER THAN MALIGNANT NEOPLASM: ICD-10-CM

## 2018-06-05 DIAGNOSIS — H60.502 UNSPECIFIED ACUTE NONINFECTIVE OTITIS EXTERNA, LEFT EAR: ICD-10-CM

## 2018-06-05 DIAGNOSIS — C44.219 BASAL CELL CARCINOMA OF SKIN OF LEFT EAR AND EXTERNAL AURICULAR CANAL: ICD-10-CM

## 2018-06-05 PROCEDURE — 99213 OFFICE O/P EST LOW 20 MIN: CPT

## 2018-06-05 RX ORDER — HYDROCORTISONE AND ACETIC ACID 1.1; 2.41 G/100ML; G/100ML
2-1 SOLUTION AURICULAR (OTIC) TWICE DAILY
Qty: 1 | Refills: 3 | Status: COMPLETED | COMMUNITY
Start: 2018-05-15 | End: 2018-06-05

## 2018-07-12 ENCOUNTER — MEDICATION RENEWAL (OUTPATIENT)
Age: 83
End: 2018-07-12

## 2018-07-12 RX ORDER — HYDROCORTISONE AND ACETIC ACID OTIC 20.75; 10.375 MG/ML; MG/ML
1-2 SOLUTION AURICULAR (OTIC) 4 TIMES DAILY
Qty: 1 | Refills: 2 | Status: ACTIVE | COMMUNITY
Start: 2018-07-12 | End: 1900-01-01

## 2018-08-18 ENCOUNTER — EMERGENCY (EMERGENCY)
Facility: HOSPITAL | Age: 83
LOS: 1 days | Discharge: HOME | End: 2018-08-18
Attending: EMERGENCY MEDICINE | Admitting: EMERGENCY MEDICINE
Payer: MEDICARE

## 2018-08-18 VITALS
SYSTOLIC BLOOD PRESSURE: 144 MMHG | DIASTOLIC BLOOD PRESSURE: 77 MMHG | OXYGEN SATURATION: 95 % | HEART RATE: 92 BPM | TEMPERATURE: 98 F | RESPIRATION RATE: 16 BRPM

## 2018-08-18 DIAGNOSIS — Z90.49 ACQUIRED ABSENCE OF OTHER SPECIFIED PARTS OF DIGESTIVE TRACT: Chronic | ICD-10-CM

## 2018-08-18 PROBLEM — I10 ESSENTIAL (PRIMARY) HYPERTENSION: Chronic | Status: ACTIVE | Noted: 2017-03-28

## 2018-08-18 PROBLEM — R31.9 HEMATURIA, UNSPECIFIED: Chronic | Status: ACTIVE | Noted: 2017-10-20

## 2018-08-18 PROBLEM — C18.9 MALIGNANT NEOPLASM OF COLON, UNSPECIFIED: Chronic | Status: ACTIVE | Noted: 2017-03-28

## 2018-08-18 PROBLEM — H91.90 UNSPECIFIED HEARING LOSS, UNSPECIFIED EAR: Chronic | Status: ACTIVE | Noted: 2017-03-28

## 2018-08-18 PROBLEM — D49.4 NEOPLASM OF UNSPECIFIED BEHAVIOR OF BLADDER: Chronic | Status: ACTIVE | Noted: 2017-10-20

## 2018-08-18 PROBLEM — C44.91 BASAL CELL CARCINOMA OF SKIN, UNSPECIFIED: Chronic | Status: ACTIVE | Noted: 2017-03-28

## 2018-08-18 PROCEDURE — 99283 EMERGENCY DEPT VISIT LOW MDM: CPT

## 2018-08-18 RX ORDER — CLOTRIMAZOLE AND BETAMETHASONE DIPROPIONATE 10; .5 MG/G; MG/G
1 CREAM TOPICAL
Qty: 1 | Refills: 0 | OUTPATIENT
Start: 2018-08-18 | End: 2018-08-24

## 2018-08-18 RX ORDER — HYDROXYZINE HCL 10 MG
1 TABLET ORAL
Qty: 7 | Refills: 0 | OUTPATIENT
Start: 2018-08-18 | End: 2018-08-24

## 2018-08-18 RX ADMIN — Medication 40 MILLIGRAM(S): at 13:34

## 2018-08-18 NOTE — ED PROVIDER NOTE - PHYSICAL EXAMINATION
NAD at rest, muc membrane moist, CTABL, abdomen normal  skin: self excoriations to anterior shoulders and back, no lesions to abd or lower extremities.  patchy and scaly areas to b/l back, slightly symmetric, no clear herald patch. left mid thorax skin papulae states has shown to cancer MD and they are watching.

## 2018-08-18 NOTE — ED PROVIDER NOTE - MEDICAL DECISION MAKING DETAILS
87 YO M with diffuse pruritis to back, unclear if herald patch, possible basal cell CA FU by PMD, steroid cream and anti-histamine. 85 YO M with diffuse pruritis to back, unclear if herald patch, ? pityriasis with spreading scaly patches, small skin lesions L thoracic back: possible basal cell CA being follwed by PMD, tx steroid cream and anti-histamine, to keep FU appt. with Derm.

## 2018-08-18 NOTE — ED ADULT TRIAGE NOTE - CHIEF COMPLAINT QUOTE
Pt. c/o pruritic rash on back x 3 weeks. Given Triamcinolone ointment but states it didn't help. Cant get appt. with dermatologist until October.

## 2018-08-18 NOTE — ED PROVIDER NOTE - OBJECTIVE STATEMENT
85 YO M c/o of 2 weeks of itchy rash, States awoke in the middle of the night with diffuse back itch. Went to urgent care, unknown cream used x1, returned because of volume of medicine small and told he is given only 1 prescription per visit. Pt saw can doctor was given steroid cream, tried 1x, noted relief for 1 hr, as itching returned; has not used again. Last used triamcinolone Monday. Rash and itch more worse and has spread to shoulders and anterior upper arms. No fever, chills, decrease PO intake or pain. PMH of HTN and colon cancer s/p resection.

## 2018-08-18 NOTE — ED PROVIDER NOTE - PMH
Basal cell carcinoma    Bladder tumor    Colon cancer  Dx'd 2007, s/p colon resection and chemotherapy  Hematuria    [place] (hard of hearing)    Hypertension

## 2018-08-23 ENCOUNTER — INPATIENT (INPATIENT)
Facility: HOSPITAL | Age: 83
LOS: 6 days | Discharge: HOSPICE | End: 2018-08-30
Attending: INTERNAL MEDICINE | Admitting: INTERNAL MEDICINE
Payer: MEDICARE

## 2018-08-23 VITALS
TEMPERATURE: 101 F | RESPIRATION RATE: 16 BRPM | SYSTOLIC BLOOD PRESSURE: 194 MMHG | OXYGEN SATURATION: 95 % | HEART RATE: 120 BPM | DIASTOLIC BLOOD PRESSURE: 96 MMHG

## 2018-08-23 DIAGNOSIS — I10 ESSENTIAL (PRIMARY) HYPERTENSION: ICD-10-CM

## 2018-08-23 DIAGNOSIS — D72.829 ELEVATED WHITE BLOOD CELL COUNT, UNSPECIFIED: ICD-10-CM

## 2018-08-23 DIAGNOSIS — R50.9 FEVER, UNSPECIFIED: ICD-10-CM

## 2018-08-23 DIAGNOSIS — R60.0 LOCALIZED EDEMA: ICD-10-CM

## 2018-08-23 DIAGNOSIS — N39.0 URINARY TRACT INFECTION, SITE NOT SPECIFIED: ICD-10-CM

## 2018-08-23 DIAGNOSIS — A41.9 SEPSIS, UNSPECIFIED ORGANISM: ICD-10-CM

## 2018-08-23 DIAGNOSIS — R31.9 HEMATURIA, UNSPECIFIED: ICD-10-CM

## 2018-08-23 DIAGNOSIS — N28.9 DISORDER OF KIDNEY AND URETER, UNSPECIFIED: ICD-10-CM

## 2018-08-23 DIAGNOSIS — Z90.49 ACQUIRED ABSENCE OF OTHER SPECIFIED PARTS OF DIGESTIVE TRACT: Chronic | ICD-10-CM

## 2018-08-23 DIAGNOSIS — R80.9 PROTEINURIA, UNSPECIFIED: ICD-10-CM

## 2018-08-23 LAB
ALBUMIN SERPL ELPH-MCNC: 3.5 G/DL — SIGNIFICANT CHANGE UP (ref 3.3–5)
ALP SERPL-CCNC: 150 U/L — HIGH (ref 40–120)
ALT FLD-CCNC: 37 U/L — SIGNIFICANT CHANGE UP (ref 4–41)
APPEARANCE UR: SIGNIFICANT CHANGE UP
APTT BLD: 27.4 SEC — LOW (ref 27.5–37.4)
AST SERPL-CCNC: 39 U/L — SIGNIFICANT CHANGE UP (ref 4–40)
BACTERIA # UR AUTO: SIGNIFICANT CHANGE UP
BASE EXCESS BLDV CALC-SCNC: 1.2 MMOL/L — SIGNIFICANT CHANGE UP
BASE EXCESS BLDV CALC-SCNC: 2.6 MMOL/L — SIGNIFICANT CHANGE UP
BASOPHILS # BLD AUTO: 0.03 K/UL — SIGNIFICANT CHANGE UP (ref 0–0.2)
BASOPHILS NFR BLD AUTO: 0.2 % — SIGNIFICANT CHANGE UP (ref 0–2)
BILIRUB SERPL-MCNC: 0.9 MG/DL — SIGNIFICANT CHANGE UP (ref 0.2–1.2)
BILIRUB UR-MCNC: NEGATIVE — SIGNIFICANT CHANGE UP
BLD GP AB SCN SERPL QL: NEGATIVE — SIGNIFICANT CHANGE UP
BLOOD GAS VENOUS - CREATININE: 1.33 MG/DL — HIGH (ref 0.5–1.3)
BLOOD GAS VENOUS - CREATININE: 1.45 MG/DL — HIGH (ref 0.5–1.3)
BLOOD UR QL VISUAL: SIGNIFICANT CHANGE UP
BUN SERPL-MCNC: 32 MG/DL — HIGH (ref 7–23)
BUN SERPL-MCNC: 40 MG/DL — HIGH (ref 7–23)
CALCIUM SERPL-MCNC: 10 MG/DL — SIGNIFICANT CHANGE UP (ref 8.4–10.5)
CALCIUM SERPL-MCNC: 9.2 MG/DL — SIGNIFICANT CHANGE UP (ref 8.4–10.5)
CHLORIDE BLDV-SCNC: 105 MMOL/L — SIGNIFICANT CHANGE UP (ref 96–108)
CHLORIDE BLDV-SCNC: 106 MMOL/L — SIGNIFICANT CHANGE UP (ref 96–108)
CHLORIDE SERPL-SCNC: 102 MMOL/L — SIGNIFICANT CHANGE UP (ref 98–107)
CHLORIDE SERPL-SCNC: 104 MMOL/L — SIGNIFICANT CHANGE UP (ref 98–107)
CO2 SERPL-SCNC: 25 MMOL/L — SIGNIFICANT CHANGE UP (ref 22–31)
CO2 SERPL-SCNC: 26 MMOL/L — SIGNIFICANT CHANGE UP (ref 22–31)
COLOR SPEC: SIGNIFICANT CHANGE UP
CREAT ?TM UR-MCNC: 35 MG/DL — SIGNIFICANT CHANGE UP
CREAT SERPL-MCNC: 1.47 MG/DL — HIGH (ref 0.5–1.3)
CREAT SERPL-MCNC: 1.56 MG/DL — HIGH (ref 0.5–1.3)
EOSINOPHIL # BLD AUTO: 0.08 K/UL — SIGNIFICANT CHANGE UP (ref 0–0.5)
EOSINOPHIL NFR BLD AUTO: 0.7 % — SIGNIFICANT CHANGE UP (ref 0–6)
EPI CELLS # UR: SIGNIFICANT CHANGE UP
GAS PNL BLDV: 137 MMOL/L — SIGNIFICANT CHANGE UP (ref 136–146)
GAS PNL BLDV: 137 MMOL/L — SIGNIFICANT CHANGE UP (ref 136–146)
GLUCOSE BLDV-MCNC: 88 — SIGNIFICANT CHANGE UP (ref 70–99)
GLUCOSE BLDV-MCNC: 94 — SIGNIFICANT CHANGE UP (ref 70–99)
GLUCOSE SERPL-MCNC: 93 MG/DL — SIGNIFICANT CHANGE UP (ref 70–99)
GLUCOSE SERPL-MCNC: 93 MG/DL — SIGNIFICANT CHANGE UP (ref 70–99)
GLUCOSE UR-MCNC: NEGATIVE — SIGNIFICANT CHANGE UP
HCO3 BLDV-SCNC: 25 MMOL/L — SIGNIFICANT CHANGE UP (ref 20–27)
HCO3 BLDV-SCNC: 26 MMOL/L — SIGNIFICANT CHANGE UP (ref 20–27)
HCT VFR BLD CALC: 35.5 % — LOW (ref 39–50)
HCT VFR BLD CALC: 37.5 % — LOW (ref 39–50)
HCT VFR BLD CALC: 39 % — SIGNIFICANT CHANGE UP (ref 39–50)
HCT VFR BLDV CALC: 33.5 % — LOW (ref 39–51)
HCT VFR BLDV CALC: 40.1 % — SIGNIFICANT CHANGE UP (ref 39–51)
HGB BLD-MCNC: 11.2 G/DL — LOW (ref 13–17)
HGB BLD-MCNC: 11.8 G/DL — LOW (ref 13–17)
HGB BLD-MCNC: 12.2 G/DL — LOW (ref 13–17)
HGB BLDV-MCNC: 10.9 G/DL — LOW (ref 13–17)
HGB BLDV-MCNC: 13 G/DL — SIGNIFICANT CHANGE UP (ref 13–17)
IMM GRANULOCYTES # BLD AUTO: 0.3 # — SIGNIFICANT CHANGE UP
IMM GRANULOCYTES NFR BLD AUTO: 2.4 % — HIGH (ref 0–1.5)
INR BLD: 1.16 — SIGNIFICANT CHANGE UP (ref 0.88–1.17)
KETONES UR-MCNC: SIGNIFICANT CHANGE UP
LACTATE BLDV-MCNC: 1.7 MMOL/L — SIGNIFICANT CHANGE UP (ref 0.5–2)
LACTATE BLDV-MCNC: 2.2 MMOL/L — HIGH (ref 0.5–2)
LEUKOCYTE ESTERASE UR-ACNC: SIGNIFICANT CHANGE UP
LYMPHOCYTES # BLD AUTO: 1.29 K/UL — SIGNIFICANT CHANGE UP (ref 1–3.3)
LYMPHOCYTES # BLD AUTO: 10.5 % — LOW (ref 13–44)
MCHC RBC-ENTMCNC: 25.7 PG — LOW (ref 27–34)
MCHC RBC-ENTMCNC: 25.7 PG — LOW (ref 27–34)
MCHC RBC-ENTMCNC: 26 PG — LOW (ref 27–34)
MCHC RBC-ENTMCNC: 31.3 % — LOW (ref 32–36)
MCHC RBC-ENTMCNC: 31.5 % — LOW (ref 32–36)
MCHC RBC-ENTMCNC: 31.5 % — LOW (ref 32–36)
MCV RBC AUTO: 81.5 FL — SIGNIFICANT CHANGE UP (ref 80–100)
MCV RBC AUTO: 82.1 FL — SIGNIFICANT CHANGE UP (ref 80–100)
MCV RBC AUTO: 82.4 FL — SIGNIFICANT CHANGE UP (ref 80–100)
MONOCYTES # BLD AUTO: 1.7 K/UL — HIGH (ref 0–0.9)
MONOCYTES NFR BLD AUTO: 13.9 % — SIGNIFICANT CHANGE UP (ref 2–14)
NEUTROPHILS # BLD AUTO: 8.86 K/UL — HIGH (ref 1.8–7.4)
NEUTROPHILS NFR BLD AUTO: 72.3 % — SIGNIFICANT CHANGE UP (ref 43–77)
NITRITE UR-MCNC: POSITIVE — SIGNIFICANT CHANGE UP
NRBC # FLD: 0 — SIGNIFICANT CHANGE UP
PCO2 BLDV: 40 MMHG — LOW (ref 41–51)
PCO2 BLDV: 43 MMHG — SIGNIFICANT CHANGE UP (ref 41–51)
PH BLDV: 7.42 PH — SIGNIFICANT CHANGE UP (ref 7.32–7.43)
PH BLDV: 7.42 PH — SIGNIFICANT CHANGE UP (ref 7.32–7.43)
PH UR: 6.5 — SIGNIFICANT CHANGE UP (ref 5–8)
PLATELET # BLD AUTO: 163 K/UL — SIGNIFICANT CHANGE UP (ref 150–400)
PLATELET # BLD AUTO: 178 K/UL — SIGNIFICANT CHANGE UP (ref 150–400)
PLATELET # BLD AUTO: 230 K/UL — SIGNIFICANT CHANGE UP (ref 150–400)
PMV BLD: 10.4 FL — SIGNIFICANT CHANGE UP (ref 7–13)
PMV BLD: 10.8 FL — SIGNIFICANT CHANGE UP (ref 7–13)
PMV BLD: 11.4 FL — SIGNIFICANT CHANGE UP (ref 7–13)
PO2 BLDV: 28 MMHG — LOW (ref 35–40)
PO2 BLDV: 42 MMHG — HIGH (ref 35–40)
POTASSIUM BLDV-SCNC: 4.1 MMOL/L — SIGNIFICANT CHANGE UP (ref 3.4–4.5)
POTASSIUM BLDV-SCNC: 4.3 MMOL/L — SIGNIFICANT CHANGE UP (ref 3.4–4.5)
POTASSIUM SERPL-MCNC: 4.1 MMOL/L — SIGNIFICANT CHANGE UP (ref 3.5–5.3)
POTASSIUM SERPL-MCNC: 4.5 MMOL/L — SIGNIFICANT CHANGE UP (ref 3.5–5.3)
POTASSIUM SERPL-SCNC: 4.1 MMOL/L — SIGNIFICANT CHANGE UP (ref 3.5–5.3)
POTASSIUM SERPL-SCNC: 4.5 MMOL/L — SIGNIFICANT CHANGE UP (ref 3.5–5.3)
PROT SERPL-MCNC: 6.7 G/DL — SIGNIFICANT CHANGE UP (ref 6–8.3)
PROT UR-MCNC: 300 — SIGNIFICANT CHANGE UP
PROTHROM AB SERPL-ACNC: 12.9 SEC — SIGNIFICANT CHANGE UP (ref 9.8–13.1)
RBC # BLD: 4.31 M/UL — SIGNIFICANT CHANGE UP (ref 4.2–5.8)
RBC # BLD: 4.6 M/UL — SIGNIFICANT CHANGE UP (ref 4.2–5.8)
RBC # BLD: 4.75 M/UL — SIGNIFICANT CHANGE UP (ref 4.2–5.8)
RBC # FLD: 19.4 % — HIGH (ref 10.3–14.5)
RBC # FLD: 19.4 % — HIGH (ref 10.3–14.5)
RBC # FLD: 19.6 % — HIGH (ref 10.3–14.5)
RBC CASTS # UR COMP ASSIST: >50 — HIGH (ref 0–?)
RH IG SCN BLD-IMP: POSITIVE — SIGNIFICANT CHANGE UP
SAO2 % BLDV: 49.3 % — LOW (ref 60–85)
SAO2 % BLDV: 75.8 % — SIGNIFICANT CHANGE UP (ref 60–85)
SODIUM SERPL-SCNC: 138 MMOL/L — SIGNIFICANT CHANGE UP (ref 135–145)
SODIUM SERPL-SCNC: 138 MMOL/L — SIGNIFICANT CHANGE UP (ref 135–145)
SP GR SPEC: 1.02 — SIGNIFICANT CHANGE UP (ref 1–1.04)
UROBILINOGEN FLD QL: NORMAL — SIGNIFICANT CHANGE UP
UUN UR-MCNC: 428.6 MG/DL — SIGNIFICANT CHANGE UP
WBC # BLD: 10.45 K/UL — SIGNIFICANT CHANGE UP (ref 3.8–10.5)
WBC # BLD: 11.71 K/UL — HIGH (ref 3.8–10.5)
WBC # BLD: 12.26 K/UL — HIGH (ref 3.8–10.5)
WBC # FLD AUTO: 10.45 K/UL — SIGNIFICANT CHANGE UP (ref 3.8–10.5)
WBC # FLD AUTO: 11.71 K/UL — HIGH (ref 3.8–10.5)
WBC # FLD AUTO: 12.26 K/UL — HIGH (ref 3.8–10.5)
WBC UR QL: SIGNIFICANT CHANGE UP (ref 0–?)

## 2018-08-23 PROCEDURE — 71045 X-RAY EXAM CHEST 1 VIEW: CPT | Mod: 26

## 2018-08-23 PROCEDURE — 99222 1ST HOSP IP/OBS MODERATE 55: CPT

## 2018-08-23 RX ORDER — ACETAMINOPHEN 500 MG
650 TABLET ORAL ONCE
Qty: 0 | Refills: 0 | Status: COMPLETED | OUTPATIENT
Start: 2018-08-23 | End: 2018-08-23

## 2018-08-23 RX ORDER — FENTANYL CITRATE 50 UG/ML
50 INJECTION INTRAVENOUS ONCE
Qty: 0 | Refills: 0 | Status: DISCONTINUED | OUTPATIENT
Start: 2018-08-23 | End: 2018-08-23

## 2018-08-23 RX ORDER — SODIUM CHLORIDE 9 MG/ML
1000 INJECTION, SOLUTION INTRAVENOUS ONCE
Qty: 0 | Refills: 0 | Status: COMPLETED | OUTPATIENT
Start: 2018-08-23 | End: 2018-08-23

## 2018-08-23 RX ORDER — CEFTRIAXONE 500 MG/1
1 INJECTION, POWDER, FOR SOLUTION INTRAMUSCULAR; INTRAVENOUS EVERY 24 HOURS
Qty: 0 | Refills: 0 | Status: DISCONTINUED | OUTPATIENT
Start: 2018-08-24 | End: 2018-08-28

## 2018-08-23 RX ORDER — HYDRALAZINE HCL 50 MG
25 TABLET ORAL EVERY 8 HOURS
Qty: 0 | Refills: 0 | Status: DISCONTINUED | OUTPATIENT
Start: 2018-08-23 | End: 2018-08-30

## 2018-08-23 RX ORDER — CEFTRIAXONE 500 MG/1
1 INJECTION, POWDER, FOR SOLUTION INTRAMUSCULAR; INTRAVENOUS ONCE
Qty: 0 | Refills: 0 | Status: COMPLETED | OUTPATIENT
Start: 2018-08-23 | End: 2018-08-23

## 2018-08-23 RX ORDER — SODIUM CHLORIDE 9 MG/ML
1000 INJECTION, SOLUTION INTRAVENOUS
Qty: 0 | Refills: 0 | Status: DISCONTINUED | OUTPATIENT
Start: 2018-08-23 | End: 2018-08-26

## 2018-08-23 RX ADMIN — FENTANYL CITRATE 50 MICROGRAM(S): 50 INJECTION INTRAVENOUS at 01:42

## 2018-08-23 RX ADMIN — SODIUM CHLORIDE 1000 MILLILITER(S): 9 INJECTION, SOLUTION INTRAVENOUS at 02:23

## 2018-08-23 RX ADMIN — CEFTRIAXONE 1 GRAM(S): 500 INJECTION, POWDER, FOR SOLUTION INTRAMUSCULAR; INTRAVENOUS at 02:23

## 2018-08-23 RX ADMIN — Medication 650 MILLIGRAM(S): at 01:42

## 2018-08-23 RX ADMIN — FENTANYL CITRATE 50 MICROGRAM(S): 50 INJECTION INTRAVENOUS at 02:05

## 2018-08-23 RX ADMIN — Medication 650 MILLIGRAM(S): at 02:42

## 2018-08-23 RX ADMIN — SODIUM CHLORIDE 1000 MILLILITER(S): 9 INJECTION, SOLUTION INTRAVENOUS at 04:46

## 2018-08-23 RX ADMIN — SODIUM CHLORIDE 1000 MILLILITER(S): 9 INJECTION, SOLUTION INTRAVENOUS at 01:50

## 2018-08-23 RX ADMIN — CEFTRIAXONE 100 GRAM(S): 500 INJECTION, POWDER, FOR SOLUTION INTRAMUSCULAR; INTRAVENOUS at 01:42

## 2018-08-23 RX ADMIN — SODIUM CHLORIDE 50 MILLILITER(S): 9 INJECTION, SOLUTION INTRAVENOUS at 16:53

## 2018-08-23 RX ADMIN — Medication 25 MILLIGRAM(S): at 17:06

## 2018-08-23 NOTE — ED ADULT NURSE NOTE - NSFALLRSKASSESSTYPE_ED_ALL_ED
PATIENT ARRIVED AMBULATORY TO ROOM C/O SYMPTOMS OF A UTI THAT STARTED 2  DAYS AGO. +FREQUENCY. +URGENCY. NO HEMATURIA. NO DYSURIA. NO ABDOMINAL PAIN. NO BACK PAIN. NO FEVERS.  NO N/V/D Initial (On Arrival)

## 2018-08-23 NOTE — ED ADULT NURSE NOTE - OBJECTIVE STATEMENT
pt arrives as notification to Tr A for hematuria. pt states acute onset of blood in urine but states also having lower abdominal pain. pt placed on cardiac monitor sinus tach notes resp labored and tachypneic. pt arrives with 22 gauge to right hand and NS fluids infusing. pt IV accessed 20 gauge RAC labs sent. waiting further orders will continue to monitor.

## 2018-08-23 NOTE — ED ADULT NURSE NOTE - NSIMPLEMENTINTERV_GEN_ALL_ED
Implemented All Universal Safety Interventions:  [place] to call system. Call bell, personal items and telephone within reach. Instruct patient to call for assistance. Room bathroom lighting operational. Non-slip footwear when patient is off stretcher. Physically safe environment: no spills, clutter or unnecessary equipment. Stretcher in lowest position, wheels locked, appropriate side rails in place.

## 2018-08-23 NOTE — CONSULT NOTE ADULT - PROBLEM SELECTOR RECOMMENDATION 2
UA consistent with UTI, pending cx
-cont zimmerman  -check USG bladder/kidney  -prob related to bladder tumor

## 2018-08-23 NOTE — CHART NOTE - NSCHARTNOTEFT_GEN_A_CORE
Patient seen at bedside. Discussed with patient regarding the importance of irrigating Tena and clot removal. Offered pain medication prior to irrigation. Patient is adamantly refusing. Explained to patient the risks of kidney damage, severe abdominal pain and even death. Patient verbalizes understanding and continues to refuse and   reports “I do not want to do period”.

## 2018-08-23 NOTE — H&P ADULT - ASSESSMENT
86y M hx HTN, remote hx colon ca, bladder tumor s/p recent tumor resection few month ago per patient, present with difficulty of urination sudden onset penile bleeding and pain with urine retention, UTI, hematuria fever, leukocytosis, sepsis

## 2018-08-23 NOTE — CONSULT NOTE ADULT - ASSESSMENT
86yoM with hx bladder CA s/p TURBT Oct 2017, now with gross hematuria, refusing intervention    -Recheck BMP  -If creatining <1.4, please obtain CT Urogram  -Order as CT Abdomen & Pelvis w & w/o contrast, with urogram protocol in notes section  -Follow up urine, blood cultures  -Monitor color  -Please call urology should patient decide to allow bladder irrigation and clot evacuation
86y M hx HTN,  remote hx colon ca, bladder tumor recent resection present with sudden onset penile bleeding nad pain with difficulties of empty his bladder. s/p zimmerman placement in ED found to have elevated scr
86y M hx HTN, remote hx colon ca, bladder tumor s/p recent tumor resection few month ago per patient, present with difficulty of urination sudden onset penile bleeding and pain with urine retention, UTI, hematuria fever, leukocytosis, sepsis

## 2018-08-23 NOTE — H&P ADULT - ATTENDING COMMENTS
pt was on prednisone at home - unclear etiology , will taper it off - start pt on prednisone 20 then taper off

## 2018-08-23 NOTE — ED PROVIDER NOTE - ATTENDING CONTRIBUTION TO CARE
86M p/w BIBEMS due to sudden penile bleeding and labile VS as notification.  Was seen here recently for itchy rash.  Never happened before.  Pt reports lower abd pain and pain by the penis.  Found to be septic here, small amount of blood in diaper.  Zimmerman placed by me after unsuccessful RN attempt, 18coude placed with return of large amount of cloudybrown urine, no active bleeding - zimmerman left in due to urinary retention.  Rx fluids, check labs incl lactate, Rx abx and admit.    VS:  septic.  BP adequate    GEN -mild distress lower abd pain, malaise; A+O x3   HEAD - NC/AT     ENT - PEERL, EOMI, mucous membranes dry , no discharge      NECK: Neck supple, non-tender without lymphadenopathy, no masses, no JVD  PULM - CTA b/l,  symmetric breath sounds  COR -  normal heart sounds    ABD - , ND, suprapubic ttp, soft,  Genital - Chap Dr Dubin - testes normal.  Penis uncircumcised, blood at meatus, no active bleeding, no lacerations or lesions seen.  Rectal external wnl, no SULMA done.  BACK - no CVA tenderness, nontender spine     EXTREMS - no edema, no deformity, warm and well perfused    SKIN - No bruising    - diffuse excoriations, no discrete rash, no vesicles.  NEUROLOGIC - alert, CN 2-12 intact, sensation nl, motor no focal deficit. 86M p/w BIBEMS due to sudden penile bleeding and labile VS as notification.  Was seen here recently for itchy rash.  Never happened before.  Pt reports lower abd pain and pain by the penis.  Found to be septic here, small amount of blood in diaper.  Zimmerman placed by me after unsuccessful RN attempt, 18coude placed with return of large amount of cloudybrown urine, no active bleeding - zimmerman left in due to urinary retention.  Rx fluids, check labs incl lactate, Rx abx and admit.    VS:  septic.  BP adequate    GEN -mild distress lower abd pain, malaise; A+O x3   HEAD - NC/AT     ENT - PEERL, EOMI, mucous membranes dry , no discharge      NECK: Neck supple, non-tender without lymphadenopathy, no masses, no JVD  PULM - CTA b/l,  symmetric breath sounds  COR -  normal heart sounds    ABD - , ND, suprapubic ttp, soft,  Genital - Chap Dr Dubin - testes normal.  Penis uncircumcised, blood at meatus, no active bleeding, no lacerations or lesions seen.  Rectal external wnl, no SULMA done.  BACK - no CVA tenderness, nontender spine     EXTREMS - (+)1 pitting edema, no deformity, warm and well perfused    SKIN - No bruising    - diffuse excoriations, no discrete rash, no vesicles.  NEUROLOGIC - alert, CN 2-12 intact, sensation nl, motor no focal deficit.

## 2018-08-23 NOTE — CONSULT NOTE ADULT - SUBJECTIVE AND OBJECTIVE BOX
[facility]   MD BOBBY FREGOSO MD ANGELA WONG, PA    TEL:  OFFICE: [phone]  DR VELASQUEZ CELL: [phone]  DR. GARCIA CELL: [phone]  SOCRATES BECERRA CELL: [phone]      HPI:  86y M hx HTN, remote hx colon ca, bladder tumor s/p recent tumor resection few month ago per patient, present with difficulty of urination sudden onset penile bleeding and pain. Pt reports pain at the penis, urge yet difficulty emptying his bladder.  No sx similar in past.  No hx kidney stones. denied chest pain, sob, fever, chills. admits to occasional LE edema.     Allergies:  No Known Allergies      PAST MEDICAL & SURGICAL HISTORY:  Hematuria  Bladder tumor  [place] (hard of hearing)  Colon cancer: Dx&#x27;d , s/p colon resection and chemotherapy  Hypertension  Basal cell carcinoma  S/P colon resection:  with chemo      Home Medications Reviewed    Hospital Medications:   MEDICATIONS  (STANDING):      SOCIAL HISTORY:  Denies ETOh, Smoking,     FAMILY HISTORY:      REVIEW OF SYSTEMS:  CONSTITUTIONAL: No weakness, fevers or chills  EYES/ENT: No visual changes;  No vertigo or throat pain   NECK: No pain or stiffness  RESPIRATORY: No cough, wheezing, hemoptysis; No shortness of breath  CARDIOVASCULAR: No chest pain or palpitations.  GASTROINTESTINAL: No abdominal or epigastric pain. No nausea, vomiting, or hematemesis; No diarrhea or constipation. No melena or hematochezia.  GENITOURINARY: see HPI  NEUROLOGICAL: No numbness or weakness  SKIN: No itching, burning, rashes, or lesions   VASCULAR: + bilateral lower extremity edema.   All other review of systems is negative unless indicated above.    VITALS:  T(F): 99.7 (18 @ 10:43), Max: 100.8 (18 @ 01:27)  HR: 103 (18 @ 10:43)  BP: 180/94 (18 @ 10:43)  RR: 17 (18 @ 10:43)  SpO2: 97% (18 @ 10:43)  Wt(kg): --     @ 07:01  -   @ 12:16  --------------------------------------------------------  IN: 0 mL / OUT: 900 mL / NET: -900 mL          PHYSICAL EXAM:  Constitutional: NAD  HEENT: anicteric sclera, oropharynx clear, MMM  Neck: No JVD  Respiratory: diffused crackles   Cardiovascular: S1, S2, RRR  Gastrointestinal: BS+, soft, NT/ND  Extremities: +2 peripheral edema b/l LE  Neurological: A/O x 3, no focal deficits  Psychiatric: Normal mood, normal affect  : + zimmerman. gross hematuria in zimmerman bag   Skin: No rashes      LABS:      138  |  104  |  40<H>  ----------------------------<  93  4.5   |  25  |  1.56<H>    Ca    10.0      23 Aug 2018 01:42    TPro  6.7  /  Alb  3.5  /  TBili  0.9  /  DBili      /  AST  39  /  ALT  37  /  AlkPhos  150<H>      Creatinine Trend: 1.56 <--                        11.2   10.45 )-----------( 178      ( 23 Aug 2018 09:06 )             35.5     Urine Studies:  Urinalysis Basic - ( 23 Aug 2018 01:42 )    Color: RED / Appearance: HAZY / S.018 / pH: 6.5  Gluc: NEGATIVE / Ketone: TRACE  / Bili: NEGATIVE / Urobili: NORMAL   Blood: LARGE / Protein: 300 / Nitrite: POSITIVE   Leuk Esterase: SMALL / RBC: >50 / WBC 20-30   Sq Epi:  / Non Sq Epi: FEW / Bacteria: LARGE          RADIOLOGY & ADDITIONAL STUDIES:

## 2018-08-23 NOTE — CONSULT NOTE ADULT - SUBJECTIVE AND OBJECTIVE BOX
NAVID VAUGHN 86y Male  MRN-[id number]    Patient is a 86y old  Male who presents with a chief complaint of penile bleeding     HPI: 86y M hx HTN, remote hx colon ca, bladder tumor s/p recent tumor resection few month ago per patient, present with difficulty of urination sudden onset penile bleeding and pain. Pt reports pain at the penis, urge yet difficulty emptying his bladder.  No sx similar in past.  No hx kidney stones. denied chest pain, sob, fever, chills. admits to occasional LE edema.     Tena placed in ER with bloody urine 900cc    Fever+ in ER    PAST MEDICAL & SURGICAL HISTORY:  Hematuria  Bladder tumor  [place] (hard of hearing)  Colon cancer: Dx&#x27;d , s/p colon resection and chemotherapy  Hypertension  Basal cell carcinoma  S/P colon resection:  with chemo      Allergies    No Known Allergies    Intolerances        ANTIMICROBIALS:  CTX 1 gm iv x 1    MEDICATIONS  (STANDING):      Social History  Smoking: no  Etoh: no  Drug use: none  Lives at home      FAMILY HISTORY: No pertinent family hx in relation to presenting complaint       Vital Signs Last 24 Hrs  T(C): 37.6 (23 Aug 2018 10:43), Max: 38.2 (23 Aug 2018 01:27)  T(F): 99.7 (23 Aug 2018 10:43), Max: 100.8 (23 Aug 2018 01:27)  HR: 103 (23 Aug 2018 10:43) (94 - 120)  BP: 180/94 (23 Aug 2018 10:43) (137/84 - 194/96)  BP(mean): --  RR: 17 (23 Aug 2018 10:43) (16 - 17)  SpO2: 97% (23 Aug 2018 10:43) (95% - 98%)    CBC Full  -  ( 23 Aug 2018 09:06 )  WBC Count : 10.45 K/uL  Hemoglobin : 11.2 g/dL  Hematocrit : 35.5 %  Platelet Count - Automated : 178 K/uL  Mean Cell Volume : 82.4 fL  Mean Cell Hemoglobin : 26.0 pg  Mean Cell Hemoglobin Concentration : 31.5 %  Auto Neutrophil # : x  Auto Lymphocyte # : x  Auto Monocyte # : x  Auto Eosinophil # : x  Auto Basophil # : x  Auto Neutrophil % : x  Auto Lymphocyte % : x  Auto Monocyte % : x  Auto Eosinophil % : x  Auto Basophil % : x    08-    138  |  104  |  40<H>  ----------------------------<  93  4.5   |  25  |  1.56<H>    Ca    10.0      23 Aug 2018 01:42    TPro  6.7  /  Alb  3.5  /  TBili  0.9  /  DBili  x   /  AST  39  /  ALT  37  /  AlkPhos  150<H>  08    LIVER FUNCTIONS - ( 23 Aug 2018 01:42 )  Alb: 3.5 g/dL / Pro: 6.7 g/dL / ALK PHOS: 150 u/L / ALT: 37 u/L / AST: 39 u/L / GGT: x           Urinalysis Basic - ( 23 Aug 2018 01:42 )    Color: RED / Appearance: HAZY / S.018 / pH: 6.5  Gluc: NEGATIVE / Ketone: TRACE  / Bili: NEGATIVE / Urobili: NORMAL   Blood: LARGE / Protein: 300 / Nitrite: POSITIVE   Leuk Esterase: SMALL / RBC: >50 / WBC 20-30   Sq Epi: x / Non Sq Epi: FEW / Bacteria: LARGE        MICROBIOLOGY:      RADIOLOGY  < from: Xray Chest 1 View- PORTABLE-Urgent (18 @ 02:57) >  Low lung volume with clear lungs

## 2018-08-23 NOTE — ED PROVIDER NOTE - OBJECTIVE STATEMENT
86y M hx HTN, recent rash, remote hx colon ca, bladder tumor x2yrs, now BIBEMS (EMS report not available at time of ED exam) as notification for sudden onset penile bleeding & variable HR found by EMS.  Pt reports pain at the penis, urge yet difficulty emptying his bladder.  No sx similar in past.  No hx kidney stones.  No hx edema or cardiac dz.  Possible hx kidney infection.   Meds: prednisone, HCTZ, hydralazine, clotrimazole 86y M hx HTN, recent rash, remote hx colon ca, bladder tumor x2yrs, now BIBEMS (EMS report not available at time of ED exam) as notification for sudden onset penile bleeding & variable HR found by EMS.  Pt reports pain at the penis, urge yet difficulty emptying his bladder.  No sx similar in past.  No hx kidney stones.  Per son @bedside no hx edema or liver/nephrotic/cardiac dz.  Possible hx kidney infection.     Meds: prednisone, HCTZ, hydralazine, clotrimazole  PMD: Chery  Onc: Yasemin   Family: Son: Pablito [place] - [phone]

## 2018-08-23 NOTE — ED PROVIDER NOTE - CRITICAL CARE PROVIDED
documentation/consultation with other physicians/additional history taking/consult w/ pt's family directly relating to pts condition/direct patient care (not related to procedure)/interpretation of diagnostic studies

## 2018-08-23 NOTE — CONSULT NOTE ADULT - ATTENDING COMMENTS
patient seen and examined, above noted. He feels more comfortable now. Will monitor and irrigate catheter as needed.
Rajendra Wiseamn  Attending Physician   Division of Infectious Disease  Pager #[phone]  After 5pm/weekend or no response, call #[phone]

## 2018-08-23 NOTE — ED PROVIDER NOTE - PMH
Basal cell carcinoma    Bladder tumor    Colon cancer  Dx'd 2007, s/p colon resection and chemotherapy  Hematuria    [place] (hard of hearing)    Hypertension

## 2018-08-23 NOTE — ED PROVIDER NOTE - PHYSICAL EXAMINATION
No abd bruits or thrills.  No pulsatile abd masses.  No pulse deficits x4 ext.  excoriation vs macular rash on R back

## 2018-08-23 NOTE — H&P ADULT - PMH
Basal cell carcinoma    Bladder tumor    Colon cancer  Dx'd 2007, s/p colon resection and chemotherapy  Hematuria    [place] (hard of hearing)    Hypertension

## 2018-08-23 NOTE — ED PROVIDER NOTE - MEDICAL DECISION MAKING DETAILS
febrile, tachy, bleeding from infx vs tumor vs less likely stone.  sepsis labs, T&S, UA, UCx, BCx, XR, zimmerman, IVF ABx

## 2018-08-23 NOTE — ED ADULT NURSE REASSESSMENT NOTE - NS ED NURSE REASSESS COMMENT FT1
received report from Sandee BERNARD. Pt is A/O x 3 respirations even and unlabored. lung sounds clear with equal chest rise  bilaterally. ABD is soft non tender and non distended with normal active bowel sounds. hematouria noted. Pt had 900ccs of dark red blood noted in zimmerman. ADS PA made are. PA to order repeat CBC. vital signs stable. awaiting for orders. will continue to monitor.

## 2018-08-23 NOTE — CONSULT NOTE ADULT - SUBJECTIVE AND OBJECTIVE BOX
86y M hx HTN, remote hx colon ca, bladder CA s/p TURBT Oct 2017 w/Dr. Man.  Patient states that the procedure was too painful for him and he has not followed up at all after surgery.  Patient now presents with difficulty of urination sudden onset penile bleeding and pain. Pt reports pain at the penis, urge yet difficulty emptying his bladder.      Called to evaluate for gross hematuria.  On arrival, urine in tube clear punch.  Attempted to irrigate catheter, Tena did not irrigate well.  During this, the patient became very upset.  Patient adamantly refused to have Tena replaced and clots irrigated out stating that it was too painful.  Discussed risks and benefits at length, patient continues to refuse.  Discussed possibility of severe bladder pain, damage to bladder/kidneys, bladder rupture, and the possibility of death from renal failure or bladder rupture.  Patient is fully alert and oriented, but states he would rather die than deal with the pain of inserting another catheter.  Attempted to irrigate existing Tena again, unable to get much urine return.    PAST MEDICAL & SURGICAL HISTORY:  Hematuria  Bladder tumor  [place] (hard of hearing)  Colon cancer: Dx&#x27;d , s/p colon resection and chemotherapy  Hypertension  Basal cell carcinoma  S/P colon resection:  with chemo    No Known Allergies    REVIEW OF SYSTEMS: Pertinent positives and negatives as stated in HPI, otherwise negative    Vital signs  T(C): 37.7, Max: 38.2 ( @ 01:27)  HR: 105  BP: 170/90  SpO2: 95%    Physical Exam  Gen: NAD, fully A&O  Pulm: No respiratory distress, no subcostal retractions  CV: RRR, no JVD  Abd: Soft, NT, ND  : Circumcised, no lesions.  Punch colored urine in Tena tubing    LABS:   @ 09:06  WBC 10.45 / Hct 35.5  / SCr --        @ 01:42  WBC 12.26 / Hct 39.0  / SCr 1.56       138  |  104  |  40<H>  ----------------------------<  93  4.5   |  25  |  1.56<H>    Ca    10.0      23 Aug 2018 01:42    TPro  6.7  /  Alb  3.5  /  TBili  0.9  /  DBili  x   /  AST  39  /  ALT  37  /  AlkPhos  150<H>  08-23    PT/INR - ( 23 Aug 2018 01:34 )   PT: 12.9 SEC;   INR: 1.16     PTT - ( 23 Aug 2018 01:34 )  PTT:27.4 SEC    Urinalysis Basic - ( 23 Aug 2018 01:42 )  Color: RED / Appearance: HAZY / S.018 / pH: 6.5  Gluc: NEGATIVE / Ketone: TRACE  / Bili: NEGATIVE / Urobili: NORMAL   Blood: LARGE / Protein: 300 / Nitrite: POSITIVE   Leuk Esterase: SMALL / RBC: >50 / WBC 20-30   Sq Epi: x / Non Sq Epi: FEW / Bacteria: LARGE    Urine Cx: p  Blood Cx: p    RADIOLOGY: Pending 86y M hx HTN, remote hx colon ca, bladder CA s/p TURBT Oct 2017 w/Dr. Man.  Patient states that the procedure was too painful for him and he has not followed up at all after surgery.  Patient now presents with difficulty of urination sudden onset penile bleeding and pain. Pt reports pain at the penis, urge yet difficulty emptying his bladder.      Called to evaluate for gross hematuria.  On arrival, urine in tube clear punch.  Attempted to irrigate catheter, Tena did not irrigate well.  During this, the patient became very upset.  Patient adamantly refused to have Tena replaced and clots irrigated out stating that it was too painful.  Discussed risks and benefits at length, patient continues to refuse.  Discussed possibility of severe bladder pain, damage to bladder/kidneys, bladder rupture, and the possibility of death from renal failure or bladder rupture.  Patient is fully alert and oriented, but states he would rather die than deal with the pain of inserting another catheter.  Attempted to irrigate existing Tena again, unable to get much urine return.    PAST MEDICAL & SURGICAL HISTORY:  Hematuria  Bladder tumor  [place] (hard of hearing)  Colon cancer: Dx&#x27;d , s/p colon resection and chemotherapy  Hypertension  Basal cell carcinoma  S/P colon resection:  with chemo    No Known Allergies    REVIEW OF SYSTEMS: Pertinent positives and negatives as stated in HPI, otherwise negative    Vital signs  T(C): 37.7, Max: 38.2 ( @ 01:27)  HR: 105  BP: 170/90  SpO2: 95%    Physical Exam  Gen: NAD, fully A&O  Pulm: No respiratory distress, no subcostal retractions  CV: RRR, no JVD  Abd: Soft, NT, ND  : No lesions.  Punch colored urine in Tena tubing    LABS:   @ 09:06  WBC 10.45 / Hct 35.5  / SCr --        @ 01:42  WBC 12.26 / Hct 39.0  / SCr 1.56       138  |  104  |  40<H>  ----------------------------<  93  4.5   |  25  |  1.56<H>    Ca    10.0      23 Aug 2018 01:42    TPro  6.7  /  Alb  3.5  /  TBili  0.9  /  DBili  x   /  AST  39  /  ALT  37  /  AlkPhos  150<H>  08-23    PT/INR - ( 23 Aug 2018 01:34 )   PT: 12.9 SEC;   INR: 1.16     PTT - ( 23 Aug 2018 01:34 )  PTT:27.4 SEC    Urinalysis Basic - ( 23 Aug 2018 01:42 )  Color: RED / Appearance: HAZY / S.018 / pH: 6.5  Gluc: NEGATIVE / Ketone: TRACE  / Bili: NEGATIVE / Urobili: NORMAL   Blood: LARGE / Protein: 300 / Nitrite: POSITIVE   Leuk Esterase: SMALL / RBC: >50 / WBC 20-30   Sq Epi: x / Non Sq Epi: FEW / Bacteria: LARGE    Urine Cx: p  Blood Cx: p    RADIOLOGY: Pending

## 2018-08-23 NOTE — CONSULT NOTE ADULT - PROBLEM SELECTOR RECOMMENDATION 4
uncontrolled  hold spironolactone  start norvasc 10mg daily  monitor
-from UTI/retention  -better  -monitor

## 2018-08-23 NOTE — H&P ADULT - HISTORY OF PRESENT ILLNESS
86y M hx HTN, remote hx colon ca, bladder tumor s/p recent tumor resection few month ago per patient, present with difficulty of urination sudden onset penile bleeding and pain. Pt reports pain at the penis, urge yet difficulty emptying his bladder.  No sx similar in past.  No hx kidney stones. denied chest pain, sob, fever, chills. admits to occasional LE edema. Tena placed in ER with bloody urine 900cc Fever+ in ER  pt was evaluated by urology, Pt refused cont bladder irrigation

## 2018-08-23 NOTE — CONSULT NOTE ADULT - PROBLEM SELECTOR RECOMMENDATION 9
patient denied hx of kidney disease, however review from [facility] record showed scr. ~1.3-1.4 in 2017. has hx of long stand HTN   renal function slightly worsen possible sec to urinary retention vs progression of disease. s/p zimmerman in place  on spironolactone at home, hold for now   continue to monitor bmp  check urine cr, urea  check PTH, phos level MARYELLEN vs MARYELLEN on CKD   patient denied hx of kidney disease, however review from [facility] record showed scr. ~1.3-1.4 in 2017.   Pt with hx of long stand HTN   renal function slightly worsen possible sec to urinary retention vs progression of disease.   s/p zimmerman in place  on spironolactone at home, hold for now   continue to monitor bmp  check urine cr, urea  check PTH, phos level

## 2018-08-24 LAB
BACTERIA UR CULT: SIGNIFICANT CHANGE UP
BUN SERPL-MCNC: 33 MG/DL — HIGH (ref 7–23)
CALCIUM SERPL-MCNC: 9 MG/DL — SIGNIFICANT CHANGE UP (ref 8.4–10.5)
CHLORIDE SERPL-SCNC: 98 MMOL/L — SIGNIFICANT CHANGE UP (ref 98–107)
CO2 SERPL-SCNC: 21 MMOL/L — LOW (ref 22–31)
CREAT SERPL-MCNC: 1.5 MG/DL — HIGH (ref 0.5–1.3)
GLUCOSE SERPL-MCNC: 107 MG/DL — HIGH (ref 70–99)
HCT VFR BLD CALC: 35.4 % — LOW (ref 39–50)
HGB BLD-MCNC: 11.4 G/DL — LOW (ref 13–17)
MCHC RBC-ENTMCNC: 25.9 PG — LOW (ref 27–34)
MCHC RBC-ENTMCNC: 32.2 % — SIGNIFICANT CHANGE UP (ref 32–36)
MCV RBC AUTO: 80.5 FL — SIGNIFICANT CHANGE UP (ref 80–100)
NRBC # FLD: 0 — SIGNIFICANT CHANGE UP
PHOSPHATE SERPL-MCNC: 3.1 MG/DL — SIGNIFICANT CHANGE UP (ref 2.5–4.5)
PLATELET # BLD AUTO: 141 K/UL — LOW (ref 150–400)
PMV BLD: 10 FL — SIGNIFICANT CHANGE UP (ref 7–13)
POTASSIUM SERPL-MCNC: 4.1 MMOL/L — SIGNIFICANT CHANGE UP (ref 3.5–5.3)
POTASSIUM SERPL-SCNC: 4.1 MMOL/L — SIGNIFICANT CHANGE UP (ref 3.5–5.3)
PTH-INTACT SERPL-MCNC: 15.53 PG/ML — SIGNIFICANT CHANGE UP (ref 15–65)
RBC # BLD: 4.4 M/UL — SIGNIFICANT CHANGE UP (ref 4.2–5.8)
RBC # FLD: 19.3 % — HIGH (ref 10.3–14.5)
SODIUM SERPL-SCNC: 136 MMOL/L — SIGNIFICANT CHANGE UP (ref 135–145)
SPECIMEN SOURCE: SIGNIFICANT CHANGE UP
WBC # BLD: 13.46 K/UL — HIGH (ref 3.8–10.5)
WBC # FLD AUTO: 13.46 K/UL — HIGH (ref 3.8–10.5)

## 2018-08-24 PROCEDURE — 99232 SBSQ HOSP IP/OBS MODERATE 35: CPT

## 2018-08-24 PROCEDURE — 74178 CT ABD&PLV WO CNTR FLWD CNTR: CPT | Mod: 26

## 2018-08-24 PROCEDURE — 93970 EXTREMITY STUDY: CPT | Mod: 26

## 2018-08-24 PROCEDURE — 99223 1ST HOSP IP/OBS HIGH 75: CPT

## 2018-08-24 PROCEDURE — 76770 US EXAM ABDO BACK WALL COMP: CPT | Mod: 26

## 2018-08-24 RX ORDER — ACETAMINOPHEN 500 MG
1000 TABLET ORAL ONCE
Qty: 0 | Refills: 0 | Status: COMPLETED | OUTPATIENT
Start: 2018-08-24 | End: 2018-08-24

## 2018-08-24 RX ORDER — DOCUSATE SODIUM 100 MG
100 CAPSULE ORAL THREE TIMES A DAY
Qty: 0 | Refills: 0 | Status: DISCONTINUED | OUTPATIENT
Start: 2018-08-24 | End: 2018-08-30

## 2018-08-24 RX ORDER — POLYETHYLENE GLYCOL 3350 17 G/17G
17 POWDER, FOR SOLUTION ORAL AT BEDTIME
Qty: 0 | Refills: 0 | Status: DISCONTINUED | OUTPATIENT
Start: 2018-08-24 | End: 2018-08-30

## 2018-08-24 RX ORDER — PANTOPRAZOLE SODIUM 20 MG/1
40 TABLET, DELAYED RELEASE ORAL
Qty: 0 | Refills: 0 | Status: DISCONTINUED | OUTPATIENT
Start: 2018-08-24 | End: 2018-08-29

## 2018-08-24 RX ORDER — LIDOCAINE HCL 20 MG/ML
20 VIAL (ML) INJECTION ONCE
Qty: 0 | Refills: 0 | Status: DISCONTINUED | OUTPATIENT
Start: 2018-08-24 | End: 2018-08-25

## 2018-08-24 RX ADMIN — SODIUM CHLORIDE 50 MILLILITER(S): 9 INJECTION, SOLUTION INTRAVENOUS at 09:52

## 2018-08-24 RX ADMIN — Medication 20 MILLIGRAM(S): at 05:42

## 2018-08-24 RX ADMIN — Medication 1000 MILLIGRAM(S): at 06:40

## 2018-08-24 RX ADMIN — CEFTRIAXONE 100 GRAM(S): 500 INJECTION, POWDER, FOR SOLUTION INTRAMUSCULAR; INTRAVENOUS at 01:10

## 2018-08-24 RX ADMIN — Medication 400 MILLIGRAM(S): at 06:16

## 2018-08-24 RX ADMIN — Medication 25 MILLIGRAM(S): at 17:15

## 2018-08-24 RX ADMIN — PANTOPRAZOLE SODIUM 40 MILLIGRAM(S): 20 TABLET, DELAYED RELEASE ORAL at 09:50

## 2018-08-24 RX ADMIN — Medication 25 MILLIGRAM(S): at 09:52

## 2018-08-24 RX ADMIN — Medication 25 MILLIGRAM(S): at 01:10

## 2018-08-24 NOTE — PROGRESS NOTE ADULT - SUBJECTIVE AND OBJECTIVE BOX
Patient reportedly in severe pain overnight, called to irrigate catheter.  Patient again fully A&O, refuses irrigation, catheter change.  Again explained risks and benefits to patient, still refusing.    T(F): 99, Max: 99.8 (08-23-18 @ 14:40)  HR: 106  BP: 125/67  SpO2: 95%    OUT:    Indwelling Catheter - Urethral: 900 mL  Total OUT: 900 mL    Gen NAD, fully A&O    Tena in place draining dark punch-colored urine    08-24 @ 06:30  WBC 13.46 / Hct 35.4  / SCr --       08-23 @ 17:18  WBC 11.71 / Hct 37.5  / SCr 1.47     BCx: NGTD  UCx: Pending

## 2018-08-24 NOTE — PROGRESS NOTE ADULT - SUBJECTIVE AND OBJECTIVE BOX
[facility]   MD BOBBY FREGOSO MD ANGELA WONG, PA    TEL:  OFFICE: [phone]  DR VELASQUEZ CELL: [phone]  DR. GARCIA CELL: [phone]  SOCRATES BECERRA CELL: [phone]        Patient is a 86y old  Male who presents with a chief complaint of hematuria (23 Aug 2018 22:29)      Patient seen and examined at bedside. No chest pain/sob    VITALS:  T(F): 99 (08-24-18 @ 05:44), Max: 99.8 (08-23-18 @ 14:40)  HR: 111 (08-24-18 @ 09:49)  BP: 128/72 (08-24-18 @ 09:49)  RR: 17 (08-24-18 @ 05:44)  SpO2: 95% (08-24-18 @ 05:44)  Wt(kg): --    08-23 @ 07:01  -  08-24 @ 07:00  --------------------------------------------------------  IN: 0 mL / OUT: 900 mL / NET: -900 mL      Height (cm): 170.18 (08-23 @ 14:40)  Weight (kg): 69.7 (08-23 @ 14:40)  BMI (kg/m2): 24.1 (08-23 @ 14:40)  BSA (m2): 1.81 (08-23 @ 14:40)    PHYSICAL EXAM:  Constitutional: NAD  Neck: No JVD  Respiratory: CTAB, no wheezes, rales or rhonchi  Cardiovascular: S1, S2, RRR  Gastrointestinal: BS+, soft, NT/ND  : + zimmerman, +gross hematuria   Extremities: 1+ peripheral edema    Hospital Medications:   MEDICATIONS  (STANDING):  cefTRIAXone   IVPB 1 Gram(s) IV Intermittent every 24 hours  docusate sodium 100 milliGRAM(s) Oral three times a day  hydrALAZINE 25 milliGRAM(s) Oral every 8 hours  lidocaine 2% Jelly 20 milliLiter(s) IntraUrethral once  pantoprazole    Tablet 40 milliGRAM(s) Oral before breakfast  predniSONE   Tablet 20 milliGRAM(s) Oral daily  sodium chloride 0.45%. 1000 milliLiter(s) (50 mL/Hr) IV Continuous <Continuous>      LABS:  08-24    136  |  98  |  33<H>  ----------------------------<  107<H>  4.1   |  21<L>  |  1.50<H>    Ca    9.0      24 Aug 2018 06:30  Phos  3.1     08-24    TPro  6.7  /  Alb  3.5  /  TBili  0.9  /  DBili      /  AST  39  /  ALT  37  /  AlkPhos  150<H>  08-23    Creatinine Trend: 1.50 <--, 1.47 <--, 1.56 <--    Phosphorus Level, Serum: 3.1 mg/dL (08-24 @ 06:30)                              11.4   13.46 )-----------( 141      ( 24 Aug 2018 06:30 )             35.4     Urine Studies:  Urinalysis - [08-23-18 @ 01:42]      Color RED / Appearance HAZY / SG 1.018 / pH 6.5      Gluc NEGATIVE / Ketone TRACE  / Bili NEGATIVE / Urobili NORMAL       Blood LARGE / Protein 300 / Leuk Est SMALL / Nitrite POSITIVE      RBC >50 / WBC 20-30 / Hyaline  / Gran  / Sq Epi  / Non Sq Epi FEW / Bacteria LARGE    Urine Creatinine 35.00      [08-23-18 @ 18:00]  Urine Urea Nitrogen 428.6      [08-23-18 @ 18:00]          RADIOLOGY & ADDITIONAL STUDIES: [facility] [place] [facility]   MD BOBBY FREGOSO MD ANGELA WONG, PA    TEL:  OFFICE: [phone]  DR VELASQUEZ CELL: [phone]  DR. GARCIA CELL: [phone]  SOCRATES BECERRA CELL: [phone]        Patient is a 86y old  Male who presents with a chief complaint of hematuria       Patient seen and examined at bedside. No chest pain/sob    VITALS:  T(F): 99 (08-24-18 @ 05:44), Max: 99.8 (08-23-18 @ 14:40)  HR: 111 (08-24-18 @ 09:49)  BP: 128/72 (08-24-18 @ 09:49)  RR: 17 (08-24-18 @ 05:44)  SpO2: 95% (08-24-18 @ 05:44)  Wt(kg): --    08-23 @ 07:01  -  08-24 @ 07:00  --------------------------------------------------------  IN: 0 mL / OUT: 900 mL / NET: -900 mL      Height (cm): 170.18 (08-23 @ 14:40)  Weight (kg): 69.7 (08-23 @ 14:40)  BMI (kg/m2): 24.1 (08-23 @ 14:40)  BSA (m2): 1.81 (08-23 @ 14:40)    PHYSICAL EXAM:  Constitutional: NAD  Neck: No JVD  Respiratory: CTAB, no wheezes, rales or rhonchi  Cardiovascular: S1, S2, RRR  Gastrointestinal: BS+, soft, NT/ND  : + zimmerman, +gross hematuria   Extremities: 1+ peripheral edema    Hospital Medications:   MEDICATIONS  (STANDING):  cefTRIAXone   IVPB 1 Gram(s) IV Intermittent every 24 hours  docusate sodium 100 milliGRAM(s) Oral three times a day  hydrALAZINE 25 milliGRAM(s) Oral every 8 hours  lidocaine 2% Jelly 20 milliLiter(s) IntraUrethral once  pantoprazole    Tablet 40 milliGRAM(s) Oral before breakfast  predniSONE   Tablet 20 milliGRAM(s) Oral daily  sodium chloride 0.45%. 1000 milliLiter(s) (50 mL/Hr) IV Continuous <Continuous>      LABS:  08-24    136  |  98  |  33<H>  ----------------------------<  107<H>  4.1   |  21<L>  |  1.50<H>    Ca    9.0      24 Aug 2018 06:30  Phos  3.1     08-24    TPro  6.7  /  Alb  3.5  /  TBili  0.9  /  DBili      /  AST  39  /  ALT  37  /  AlkPhos  150<H>  08-23    Creatinine Trend: 1.50 <--, 1.47 <--, 1.56 <--    Phosphorus Level, Serum: 3.1 mg/dL (08-24 @ 06:30)                              11.4   13.46 )-----------( 141      ( 24 Aug 2018 06:30 )             35.4     Urine Studies:  Urinalysis - [08-23-18 @ 01:42]      Color RED / Appearance HAZY / SG 1.018 / pH 6.5      Gluc NEGATIVE / Ketone TRACE  / Bili NEGATIVE / Urobili NORMAL       Blood LARGE / Protein 300 / Leuk Est SMALL / Nitrite POSITIVE      RBC >50 / WBC 20-30 / Hyaline  / Gran  / Sq Epi  / Non Sq Epi FEW / Bacteria LARGE    Urine Creatinine 35.00      [08-23-18 @ 18:00]  Urine Urea Nitrogen 428.6      [08-23-18 @ 18:00]          RADIOLOGY & ADDITIONAL STUDIES:

## 2018-08-24 NOTE — PROGRESS NOTE ADULT - ASSESSMENT
86yoM with hx bladder CA s/p TURBT Oct 2017, now with gross hematuria, refusing intervention    -CT Urogram ordered  -Follow up urine, blood cultures  -Monitor color  -Please call urology should patient decide to allow bladder irrigation and clot evacuation

## 2018-08-24 NOTE — CHART NOTE - NSCHARTNOTEFT_GEN_A_CORE
Called again to evaluate patient for Tena catheter leaking.  Leaking urine around catheter, patient is not currently uncomfortable.  Again explained to patient risks and benefits of replacing catheter to irrigate clots and start CBI if needed.  Patient is fully alert and oriented, patient refuses catheter irrigation or change at this time.  Patient aware of risks of clot retention including bladder rupture and death.  Consider giving belladonna and opium suppositories PRN bladder spasms.

## 2018-08-24 NOTE — PROGRESS NOTE ADULT - SUBJECTIVE AND OBJECTIVE BOX
chief complaint : hematuria        SUBJECTIVE / OVERNIGHT EVENTS: pt still refusing cbi, ZIMMERMAN CHANGE FIR cbi, denies chest pain, shortness of breath, nausea,  v      MEDICATIONS  (STANDING):  cefTRIAXone   IVPB 1 Gram(s) IV Intermittent every 24 hours  docusate sodium 100 milliGRAM(s) Oral three times a day  hydrALAZINE 25 milliGRAM(s) Oral every 8 hours  lidocaine 2% Jelly 20 milliLiter(s) IntraUrethral once  pantoprazole    Tablet 40 milliGRAM(s) Oral before breakfast  sodium chloride 0.45%. 1000 milliLiter(s) (50 mL/Hr) IV Continuous <Continuous>    MEDICATIONS  (PRN):  polyethylene glycol 3350 17 Gram(s) Oral at bedtime PRN Constipation      Vital Signs Last 24 Hrs  T(C): 36.6 (24 Aug 2018 20:11), Max: 37.2 (24 Aug 2018 05:44)  T(F): 97.8 (24 Aug 2018 20:11), Max: 99 (24 Aug 2018 05:44)  HR: 106 (24 Aug 2018 20:11) (97 - 111)  BP: 106/60 (24 Aug 2018 20:11) (106/60 - 150/85)  BP(mean): --  RR: 17 (24 Aug 2018 20:11) (17 - 18)  SpO2: 97% (24 Aug 2018 20:11) (94% - 97%)  CAPILLARY BLOOD GLUCOSE        I&O's Summary    23 Aug 2018 07:01  -  24 Aug 2018 07:00  --------------------------------------------------------  IN: 0 mL / OUT: 900 mL / NET: -900 mL        Constitutional: No fever, fatigue  Skin: No rash.  Eyes: No recent vision problems or eye pain.  ENT: No congestion, ear pain, or sore throat.  Cardiovascular: No chest pain or palpation.  Respiratory: No cough, shortness of breath, congestion, or wheezing.  Gastrointestinal: No abdominal pain, nausea, vomiting, or diarrhea.  Genitourinary: No dysuria.  Musculoskeletal: No joint swelling.  Neurologic: No headache.    PHYSICAL EXAM:  GENERAL: NAD  EYES: EOMI, PERRLA  NECK: Supple, No JVD  CHEST/LUNG: dec breath sounds at bases   HEART:  S1 , S2 +  ABDOMEN: sof,t bs+  EXTREMITIES:  trace edema+  NEUROLOGY:alert awake     zimmerman = blood tinged urine +      LABS:                        11.4   13.46 )-----------( 141      ( 24 Aug 2018 06:30 )             35.4     08-    136  |  98  |  33<H>  ----------------------------<  107<H>  4.1   |  21<L>  |  1.50<H>    Ca    9.0      24 Aug 2018 06:30  Phos  3.1         TPro  6.7  /  Alb  3.5  /  TBili  0.9  /  DBili  x   /  AST  39  /  ALT  37  /  AlkPhos  150<H>      PT/INR - ( 23 Aug 2018 01:34 )   PT: 12.9 SEC;   INR: 1.16          PTT - ( 23 Aug 2018 01:34 )  PTT:27.4 SEC      Urinalysis Basic - ( 23 Aug 2018 01:42 )    Color: RED / Appearance: HAZY / S.018 / pH: 6.5  Gluc: NEGATIVE / Ketone: TRACE  / Bili: NEGATIVE / Urobili: NORMAL   Blood: LARGE / Protein: 300 / Nitrite: POSITIVE   Leuk Esterase: SMALL / RBC: >50 / WBC 20-30   Sq Epi: x / Non Sq Epi: FEW / Bacteria: LARGE        RADIOLOGY & ADDITIONAL TESTS:    Imaging Personally Reviewed:    Consultant(s) Notes Reviewed:      Care Discussed with Consultants/Other Providers:

## 2018-08-24 NOTE — PROGRESS NOTE ADULT - PROBLEM SELECTOR PLAN 1
patient denied hx of kidney disease, however review from [facility] record showed scr. ~1.3-1.4 in 2017.   Pt with hx of long stand HTN   renal function slightly worsen but relatively stable likely progression of disease.   s/p zimmerman in place  on spironolactone at home, hold for now   continue to monitor bmp  phos level is good  pending PTH level patient denied hx of kidney disease, however review from [facility] record showed scr. ~1.3-1.4 in 2017.   Pt with hx of long stand HTN   renal function relatively stable   s/p zimmerman in place  on spironolactone at home, hold for now   continue to monitor bmp  phos level is ok  pending PTH level

## 2018-08-24 NOTE — PROGRESS NOTE ADULT - SUBJECTIVE AND OBJECTIVE BOX
NAVID VAUGHN 86y [id number]    Patient is a 86y old  Male who presents with a chief complaint of hematuria (23 Aug 2018 22:29)      Follow Up/CC:  ID following for fever    Interval History/ROS: feels ok, events noted, refusing irrigation of bladder clots    Allergies    No Known Allergies    Intolerances        ANTIMICROBIALS:  cefTRIAXone   IVPB 1 every 24 hours      MEDICATIONS  (STANDING):  cefTRIAXone   IVPB 1 Gram(s) IV Intermittent every 24 hours  docusate sodium 100 milliGRAM(s) Oral three times a day  hydrALAZINE 25 milliGRAM(s) Oral every 8 hours  lidocaine 2% Jelly 20 milliLiter(s) IntraUrethral once  pantoprazole    Tablet 40 milliGRAM(s) Oral before breakfast  predniSONE   Tablet 20 milliGRAM(s) Oral daily  sodium chloride 0.45%. 1000 milliLiter(s) (50 mL/Hr) IV Continuous <Continuous>    MEDICATIONS  (PRN):  polyethylene glycol 3350 17 Gram(s) Oral at bedtime PRN Constipation        Vital Signs Last 24 Hrs  T(C): 36.6 (24 Aug 2018 12:41), Max: 37.2 (24 Aug 2018 05:44)  T(F): 97.8 (24 Aug 2018 12:41), Max: 99 (24 Aug 2018 05:44)  HR: 97 (24 Aug 2018 12:41) (97 - 111)  BP: 118/60 (24 Aug 2018 12:41) (118/60 - 131/80)  BP(mean): --  RR: 18 (24 Aug 2018 12:41) (17 - 18)  SpO2: 96% (24 Aug 2018 12:41) (94% - 96%)    CBC Full  -  ( 24 Aug 2018 06:30 )  WBC Count : 13.46 K/uL  Hemoglobin : 11.4 g/dL  Hematocrit : 35.4 %  Platelet Count - Automated : 141 K/uL  Mean Cell Volume : 80.5 fL  Mean Cell Hemoglobin : 25.9 pg  Mean Cell Hemoglobin Concentration : 32.2 %  Auto Neutrophil # : x  Auto Lymphocyte # : x  Auto Monocyte # : x  Auto Eosinophil # : x  Auto Basophil # : x  Auto Neutrophil % : x  Auto Lymphocyte % : x  Auto Monocyte % : x  Auto Eosinophil % : x  Auto Basophil % : x    08-24    136  |  98  |  33<H>  ----------------------------<  107<H>  4.1   |  21<L>  |  1.50<H>    Ca    9.0      24 Aug 2018 06:30  Phos  3.1         TPro  6.7  /  Alb  3.5  /  TBili  0.9  /  DBili  x   /  AST  39  /  ALT  37  /  AlkPhos  150<H>      LIVER FUNCTIONS - ( 23 Aug 2018 01:42 )  Alb: 3.5 g/dL / Pro: 6.7 g/dL / ALK PHOS: 150 u/L / ALT: 37 u/L / AST: 39 u/L / GGT: x           Urinalysis Basic - ( 23 Aug 2018 01:42 )    Color: RED / Appearance: HAZY / S.018 / pH: 6.5  Gluc: NEGATIVE / Ketone: TRACE  / Bili: NEGATIVE / Urobili: NORMAL   Blood: LARGE / Protein: 300 / Nitrite: POSITIVE   Leuk Esterase: SMALL / RBC: >50 / WBC 20-30   Sq Epi: x / Non Sq Epi: FEW / Bacteria: LARGE        MICROBIOLOGY:  URINE CATHETER  18 --  --  --      BLOOD VENOUS  18 --  --  --              v            RADIOLOGY

## 2018-08-24 NOTE — CHART NOTE - NSCHARTNOTEFT_GEN_A_CORE
Called by radiology with abnormal abd US findings: left kidney with suspected soft tissue in the collecting system. No discrete hydronephrosis.  Plan for CT urogram today, will f/u results and d/w urology     ADSNP [id number]

## 2018-08-24 NOTE — PROGRESS NOTE ADULT - ASSESSMENT
86y M hx HTN,  remote hx colon ca, bladder tumor recent resection present with sudden onset penile bleeding nad pain with difficulties of empty his bladder. s/p zimmerman placement in ED found to have elevated scr

## 2018-08-25 ENCOUNTER — TRANSCRIPTION ENCOUNTER (OUTPATIENT)
Age: 83
End: 2018-08-25

## 2018-08-25 LAB
HCT VFR BLD CALC: 36.9 % — LOW (ref 39–50)
HGB BLD-MCNC: 11.8 G/DL — LOW (ref 13–17)
MCHC RBC-ENTMCNC: 26.8 PG — LOW (ref 27–34)
MCHC RBC-ENTMCNC: 32 % — SIGNIFICANT CHANGE UP (ref 32–36)
MCV RBC AUTO: 83.9 FL — SIGNIFICANT CHANGE UP (ref 80–100)
NRBC # FLD: 0 — SIGNIFICANT CHANGE UP
PLATELET # BLD AUTO: 159 K/UL — SIGNIFICANT CHANGE UP (ref 150–400)
PMV BLD: 11 FL — SIGNIFICANT CHANGE UP (ref 7–13)
RBC # BLD: 4.4 M/UL — SIGNIFICANT CHANGE UP (ref 4.2–5.8)
RBC # FLD: 20.6 % — HIGH (ref 10.3–14.5)
WBC # BLD: 14.57 K/UL — HIGH (ref 3.8–10.5)
WBC # FLD AUTO: 14.57 K/UL — HIGH (ref 3.8–10.5)

## 2018-08-25 PROCEDURE — 99232 SBSQ HOSP IP/OBS MODERATE 35: CPT

## 2018-08-25 PROCEDURE — 93010 ELECTROCARDIOGRAM REPORT: CPT

## 2018-08-25 RX ORDER — MORPHINE SULFATE 50 MG/1
2 CAPSULE, EXTENDED RELEASE ORAL ONCE
Qty: 0 | Refills: 0 | Status: DISCONTINUED | OUTPATIENT
Start: 2018-08-25 | End: 2018-08-25

## 2018-08-25 RX ORDER — HALOPERIDOL DECANOATE 100 MG/ML
0.25 INJECTION INTRAMUSCULAR ONCE
Qty: 0 | Refills: 0 | Status: DISCONTINUED | OUTPATIENT
Start: 2018-08-25 | End: 2018-08-25

## 2018-08-25 RX ORDER — FENTANYL CITRATE 50 UG/ML
50 INJECTION INTRAVENOUS ONCE
Qty: 0 | Refills: 0 | Status: DISCONTINUED | OUTPATIENT
Start: 2018-08-25 | End: 2018-08-25

## 2018-08-25 RX ADMIN — Medication 100 MILLIGRAM(S): at 13:00

## 2018-08-25 RX ADMIN — SODIUM CHLORIDE 50 MILLILITER(S): 9 INJECTION, SOLUTION INTRAVENOUS at 09:20

## 2018-08-25 RX ADMIN — MORPHINE SULFATE 2 MILLIGRAM(S): 50 CAPSULE, EXTENDED RELEASE ORAL at 01:34

## 2018-08-25 RX ADMIN — Medication 10 MILLIGRAM(S): at 05:53

## 2018-08-25 RX ADMIN — Medication 25 MILLIGRAM(S): at 01:04

## 2018-08-25 RX ADMIN — MORPHINE SULFATE 2 MILLIGRAM(S): 50 CAPSULE, EXTENDED RELEASE ORAL at 01:50

## 2018-08-25 RX ADMIN — Medication 25 MILLIGRAM(S): at 11:45

## 2018-08-25 RX ADMIN — PANTOPRAZOLE SODIUM 40 MILLIGRAM(S): 20 TABLET, DELAYED RELEASE ORAL at 05:53

## 2018-08-25 RX ADMIN — Medication 100 MILLIGRAM(S): at 21:32

## 2018-08-25 RX ADMIN — Medication 0.25 MILLIGRAM(S): at 00:50

## 2018-08-25 RX ADMIN — CEFTRIAXONE 100 GRAM(S): 500 INJECTION, POWDER, FOR SOLUTION INTRAMUSCULAR; INTRAVENOUS at 00:50

## 2018-08-25 RX ADMIN — Medication 100 MILLIGRAM(S): at 05:52

## 2018-08-25 RX ADMIN — Medication 25 MILLIGRAM(S): at 17:16

## 2018-08-25 NOTE — PROCEDURE NOTE - NSURITECHNIQUE_GU_A_CORE
Proper hand hygiene was performed/Sterile gloves were worn for the duration of the procedure/The catheter was secured with a securement device (e.g. StatLock)/All applicable medical record documentation is completed/The site was cleaned with soap/water and sterile solution (betadine)/The urinary drainage system is closed at the end of the procedure/A sterile drape was used to cover all adjacent areas/The catheter was appropriately lubricated/The collection bag is below the level of the patient and urinary bladder

## 2018-08-25 NOTE — DISCHARGE NOTE ADULT - HOME CARE AGENCY
IF pt is d/c from rehab on 9/1 [facility] would need to be called to set up home O2 tel # [phone].

## 2018-08-25 NOTE — DISCHARGE NOTE ADULT - PATIENT PORTAL LINK FT
You can access the [facility] Patient Portal, offered by [facility], by registering with the following website: [email or web address]

## 2018-08-25 NOTE — DISCHARGE NOTE ADULT - PLAN OF CARE
resolved infection treated with IV antibiotics, comfort measures CT Urogram showed tumor in bladder and collecting system, likely mets to Lymph Nodes and bone s/p IV antibiotics;your completed your course of antibiotics   Please follow up with the medical doctors upon arrival to rehab  -palliative cs-son agreed for DNR-interested in comfort measures resolving Please continue zimmerman as prescribed.  Please Continue zimmerman care as per protocol. Contine to hold spironolactone. maintain adequate blood pressure control Please continue hydralazine as prescribed  hold for SBP<100

## 2018-08-25 NOTE — DISCHARGE NOTE ADULT - ADDITIONAL INSTRUCTIONS
Return to the Hospital Emergency Department for any worsening symptoms or concerns, fevers temperature of 101.0F or higher, chills, chest pain, shortness of breath, vomiting, abdominal pain, rashes, neck pain, back pain, numbness, paresthesias, pain or any difficulties at all.  Please follow up with your own private physician or our medical clinic at [phone] with in one week.   Find a doctor at [phone].  For questions regarding prescriptions call . Continue Oxygen as prescribed 2liters nasal cannula

## 2018-08-25 NOTE — PROGRESS NOTE ADULT - SUBJECTIVE AND OBJECTIVE BOX
Follow Up:      Inverval History/ROS:Patient is a 86y old  Male who presents with a chief complaint of hematuria (23 Aug 2018 22:29)    Febrile at admission. Afebrile at this time.  CBI in place    Allergies    No Known Allergies    Intolerances        ANTIMICROBIALS:  cefTRIAXone   IVPB 1 every 24 hours      OTHER MEDS:  docusate sodium 100 milliGRAM(s) Oral three times a day  haloperidol    Injectable 0.25 milliGRAM(s) IV Push once  hydrALAZINE 25 milliGRAM(s) Oral every 8 hours  lidocaine 2% Jelly 20 milliLiter(s) IntraUrethral once  pantoprazole    Tablet 40 milliGRAM(s) Oral before breakfast  polyethylene glycol 3350 17 Gram(s) Oral at bedtime PRN  predniSONE   Tablet 10 milliGRAM(s) Oral daily  sodium chloride 0.45%. 1000 milliLiter(s) IV Continuous <Continuous>      Vital Signs Last 24 Hrs  T(C): 36.4 (25 Aug 2018 11:42), Max: 36.6 (24 Aug 2018 12:41)  T(F): 97.5 (25 Aug 2018 11:42), Max: 97.8 (24 Aug 2018 12:41)  HR: 109 (25 Aug 2018 11:42) (97 - 111)  BP: 117/59 (25 Aug 2018 11:42) (106/60 - 168/86)  BP(mean): --  RR: 17 (25 Aug 2018 11:42) (17 - 18)  SpO2: 96% (25 Aug 2018 11:42) (96% - 98%)    PHYSICAL EXAM:  General: [x ] non-toxic  HEAD/EYES: [ ] PERRL [x ] white sclera [ ] icterus  ENT:  [ ] normal [ x] supple [ ] thrush [ ] pharyngeal exudate  Cardiovascular:   [ ] murmur [x ] normal [ ] PPM/AICD  Respiratory:  [x ] clear to ausculation bilaterally  GI:  [ x] soft, non-tender, normal bowel sounds  :  [x ] zimmerman [ ] no CVA tenderness   Musculoskeletal:  [ ] no synovitis  Neurologic:  [ x] non-focal exam   Skin:  [x ] no rash  Lymph: [ ] no lymphadenopathy  Psychiatric:  [x ] appropriate affect [ ] alert & oriented  Lines:  [x ] no phlebitis [ ] central line                                11.4   13.46 )-----------( 141      ( 24 Aug 2018 06:30 )             35.4       08-24    136  |  98  |  33<H>  ----------------------------<  107<H>  4.1   |  21<L>  |  1.50<H>    Ca    9.0      24 Aug 2018 06:30  Phos  3.1     08-24            MICROBIOLOGY:    RADIOLOGY:    < from: CT Abdomen and Pelvis w/wo IV Cont (08.24.18 @ 18:16) >  IMPRESSION:     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     Questionable soft tissue filling defect in the distal right ureter.    Retroperitoneal lymphadenopathy and small volume abdominal and pelvic   free fluid.    Lytic bone lesions concerning for metastases, including a left iliac bone   lesion with overlying cortical irregularity.    Trace right and small left pleural effusion with associated atelectasis.   Patchy groundglass and tree-in-bud opacities in the right lung which may   be infectious or associated with aspiration given the distended   fluid-filled esophagus.    Unchanged splenomegaly.

## 2018-08-25 NOTE — DISCHARGE NOTE ADULT - MEDICATION SUMMARY - MEDICATIONS TO STOP TAKING
I will STOP taking the medications listed below when I get home from the hospital:  None I will STOP taking the medications listed below when I get home from the hospital:    spironolactone 25 mg oral tablet  -- 1 tab(s) by mouth once a day

## 2018-08-25 NOTE — PROCEDURE NOTE - NSPOSTCAREGUIDE_GEN_A_CORE
Instructed patient/caregiver to follow-up with primary care physician/Verbal/written post procedure instructions were given to patient/caregiver/Instructed patient/caregiver regarding signs and symptoms of infection

## 2018-08-25 NOTE — DISCHARGE NOTE ADULT - CARE PLAN
Principal Discharge DX:	Sepsis due to urinary tract infection  Secondary Diagnosis:	Hematuria  Secondary Diagnosis:	Bladder tumor Principal Discharge DX:	Sepsis due to urinary tract infection  Goal:	resolved infection  Assessment and plan of treatment:	treated with IV antibiotics,  Secondary Diagnosis:	Hematuria  Goal:	comfort measures  Secondary Diagnosis:	Bladder tumor  Goal:	comfort measures  Assessment and plan of treatment:	CT Urogram showed tumor in bladder and collecting system, likely mets to Lymph Nodes and bone Principal Discharge DX:	Sepsis due to urinary tract infection  Goal:	resolved infection  Assessment and plan of treatment:	s/p IV antibiotics;your completed your course of antibiotics   Please follow up with the medical doctors upon arrival to rehab  -palliative cs-son agreed for DNR-interested in comfort measures  Secondary Diagnosis:	Hematuria  Goal:	resolving  Assessment and plan of treatment:	Please continue zimmerman as prescribed.  Please Continue zimmerman care as per protocol.  Secondary Diagnosis:	Bladder tumor  Goal:	comfort measures  Assessment and plan of treatment:	CT Urogram showed tumor in bladder and collecting system, likely mets to Lymph Nodes and bone  Secondary Diagnosis:	Renal insufficiency  Goal:	resolving  Assessment and plan of treatment:	Contine to hold spironolactone.  Secondary Diagnosis:	Hypertension  Goal:	maintain adequate blood pressure control  Assessment and plan of treatment:	Please continue hydralazine as prescribed  hold for SBP<100

## 2018-08-25 NOTE — PROCEDURE NOTE - NSPROCDETAILS_GEN_ALL_CORE
sterile technique, indwelling urinary device inserted/prior to placement, an active physician order for the placement of a urinary catheter was verified/kit not available for this size catheter

## 2018-08-25 NOTE — DISCHARGE NOTE ADULT - MEDICATION SUMMARY - MEDICATIONS TO TAKE
I will START or STAY ON the medications listed below when I get home from the hospital:    spironolactone 25 mg oral tablet  -- 1 tab(s) by mouth once a day  -- Indication: For Hypertension    hydrOXYzine pamoate 25 mg oral capsule  -- 1 cap(s) by mouth once a day (at bedtime) MDD:3  -- May cause drowsiness.  Alcohol may intensify this effect.  Use care when operating dangerous machinery.  Obtain medical advice before taking any non-prescription drugs as some may affect the action of this medication.    -- Indication: For Insomnia    docusate sodium 100 mg oral capsule  -- 1 cap(s) by mouth 3 times a day  -- Indication: For Constipation    senna oral tablet  -- 1 tab(s) by mouth once a day (at bedtime)  -- Indication: For Constipation    pantoprazole 40 mg oral delayed release tablet  -- 1 tab(s) by mouth once a day (before a meal)  -- Indication: For Reflux I will START or STAY ON the medications listed below when I get home from the hospital:    hydrOXYzine pamoate 25 mg oral capsule  -- 1 cap(s) by mouth once a day (at bedtime) MDD:3  -- May cause drowsiness.  Alcohol may intensify this effect.  Use care when operating dangerous machinery.  Obtain medical advice before taking any non-prescription drugs as some may affect the action of this medication.    -- Indication: For Insomnia    docusate sodium 100 mg oral capsule  -- 1 cap(s) by mouth 3 times a day  -- Indication: For Constipation    senna oral tablet  -- 1 tab(s) by mouth once a day (at bedtime)  -- Indication: For Constipation    pantoprazole 40 mg oral delayed release tablet  -- 1 tab(s) by mouth once a day (before a meal)  -- Indication: For Reflux    hydrALAZINE 25 mg oral tablet  -- 1 tab(s) by mouth every 8 hours  -- Indication: For Htn

## 2018-08-25 NOTE — PROGRESS NOTE ADULT - SUBJECTIVE AND OBJECTIVE BOX
Overnight patient allowed irrigation.  6 eye catheter placed, minimal clot irrigated.  24F 3-way catheter placed.    T(F): 97.8, Max: 97.8 (08-24-18 @ 12:41)  HR: 105  BP: 134/69  SpO2: 98%    Gen NAD, A&O x4  Abd Soft, NT, ND   Tena draining clear red urine    08-24 @ 06:30  WBC 13.46 / Hct 35.4  / SCr 1.50     08-23 @ 17:18  WBC 11.71 / Hct 37.5  / SCr 1.47     URINE CATHETER  08-23--neg    BLOOD VENOUS  08-23--NGTD

## 2018-08-25 NOTE — PROGRESS NOTE ADULT - ASSESSMENT
86yoM with hx bladder CA s/p TURBT Oct 2017, now with gross hematuria, refusing intervention    -CT Urogram with tumor in bladder and collecting system  -UCx neg, BCx NGTD  -Monitor color  -3 way in place if patient requires CBI

## 2018-08-25 NOTE — DISCHARGE NOTE ADULT - COMMUNITY RESOURCES
[facility] [place] for re[facility] and Nursing address: [address] [place] [address] tel # [phone], 5pm  via [facility] tel # [phone]. Submission of ambulance auth # faxed to [phone] [place] [facility] [place] for re[facility] and [facility] address: [address] [place] [address] tel # [phone], 2pm  via [facility] tel # [phone]. Submission of ambulance auth # faxed to [phone]

## 2018-08-25 NOTE — PROGRESS NOTE ADULT - PROBLEM SELECTOR PLAN 1
ct - multifocal urothelial cell cancer with   an infiltrative left renal mass involving the collecting system and left   ureter to the level of the urinary bladder where there are multifocal   nodular masses. soft tissue filling defect in the distal right ureter.Retroperitoneal lymphadenopathy and small volume abdominal and pelvic   free fluid, Lytic bone lesions concerning for metastases, including a left iliac bone   lesion with overlying cortical irregularity, Trace right and small left pleural effusion with associated atelectasis.   Patchy groundglass and tree-in-bud opacities in the right lung which may   be infectious or associated with aspiration given the distended   fluid-filled esophagus.Unchanged splenomegaly.'  discussed findings with pts son - pts HCP - requesting comfort care   will obtain palliative eval pt

## 2018-08-25 NOTE — DISCHARGE NOTE ADULT - HOSPITAL COURSE
86y M hx HTN, remote hx colon ca, bladder tumor s/p recent tumor resection few month ago per patient, present with difficulty of urination sudden onset penile bleeding and pain. Pt reports pain at the penis, urge yet difficulty emptying his bladder.  No sx similar in past.  No hx kidney stones. denied chest pain, sob, fever, chills. admits to occasional LE edema. Tena placed in ER with bloody urine 900cc Fever+ in ER 86y M hx HTN, remote hx colon ca, bladder tumor s/p recent tumor resection few month ago per patient, present with difficulty of urination sudden onset penile bleeding and pain. Pt reports pain at the penis, urge yet difficulty emptying his bladder.  No sx similar in past.  No hx kidney stones. denied chest pain, sob, fever, chills. admits to occasional LE edema. Tena placed in ER with bloody urine 900cc Fever+ in ER. UCx neg, BCx NGTD  Tena placed by urology. CT Urogram with tumor in bladder and collecting system, likely mets to LNs and bone. Family discussion re GOC- wishes comfort care, home w/hospice 86y M hx HTN, remote hx colon ca, bladder tumor s/p recent tumor resection few month ago per patient, present with difficulty of urination sudden onset penile bleeding and pain. Pt reports pain at the penis, urge yet difficulty emptying his bladder.  No sx similar in past.  No hx kidney stones. denied chest pain, sob, fever, chills. admits to occasional LE edema. Zimmerman placed in ER with bloody urine 900cc Fever+ in ER. UCx neg, BCx NGTD  Zimmerman placed by urology. CT Urogram with tumor in bladder and collecting system, likely mets to LNs and bone. Family discussion re GO- wishes comfort care  Urosepsis/hematuria  -3 way Zimmerman placed 8/25 by urology  if patient requires CBI  -IV ceftriaxone: completed course   -CT Urogram with tumor in bladder and collecting system  - Urine cx: NTD , Blood cx NTD   -palliative cs-son agreed for DNR-interested in comfort measures     Renal failure   - s/p zimmerman in place in ED-pt refuses CBI  -Home spironolactone  on hold     Hypertension  -Hydralazine    Lower extremity edema  -US LE r/o DVT  -CXR:Low lung volume with clear lungs.    PT Rehab    Dispo: Rehab

## 2018-08-25 NOTE — PROGRESS NOTE ADULT - SUBJECTIVE AND OBJECTIVE BOX
NAVID VAUGHN  HPI:  Admitted for hematuria, noted to have CKD. Not CBI.  Has CKD and HTN, and his Aldactone is held.    HEALTH ISSUES - PROBLEM Dx:  Leukocytosis: Leukocytosis  Fever: Fever  Sepsis due to urinary tract infection: Sepsis due to urinary tract infection  Lower extremity edema: Lower extremity edema  Proteinuria: Proteinuria  Hypertension: Hypertension  Hematuria: Hematuria  UTI (urinary tract infection): UTI (urinary tract infection)  Renal insufficiency: Renal insufficiency        MEDICATIONS  (STANDING):  cefTRIAXone   IVPB 1 Gram(s) IV Intermittent every 24 hours  docusate sodium 100 milliGRAM(s) Oral three times a day  haloperidol    Injectable 0.25 milliGRAM(s) IV Push once  hydrALAZINE 25 milliGRAM(s) Oral every 8 hours  lidocaine 2% Jelly 20 milliLiter(s) IntraUrethral once  pantoprazole    Tablet 40 milliGRAM(s) Oral before breakfast  predniSONE   Tablet 10 milliGRAM(s) Oral daily  sodium chloride 0.45%. 1000 milliLiter(s) (50 mL/Hr) IV Continuous <Continuous>    MEDICATIONS  (PRN):  polyethylene glycol 3350 17 Gram(s) Oral at bedtime PRN Constipation    Vital Signs Last 24 Hrs  T(C): 36.4 (25 Aug 2018 11:42), Max: 36.6 (24 Aug 2018 12:41)  T(F): 97.5 (25 Aug 2018 11:42), Max: 97.8 (24 Aug 2018 12:41)  HR: 109 (25 Aug 2018 11:42) (97 - 111)  BP: 117/59 (25 Aug 2018 11:42) (106/60 - 168/86)  BP(mean): --  RR: 17 (25 Aug 2018 11:42) (17 - 18)  SpO2: 96% (25 Aug 2018 11:42) (96% - 98%)  Daily     Daily     PHYSICAL EXAM:  Constitutional:  HE appears comfortable and not distressed. Not diaphoretic.    Neck:  The thyroid is normal. Trachea is midline.     Respiratory: The lungs are clear to auscultation. No dullness and expansion is normal.    Cardiovascular: S1 and S2 are normal. No mummurs, rubs or gallops are present.    Gastrointestinal: The abdomen is soft. No tenderness is present. No masses are present. Bowel sounds are normal.    Genitourinary: The bladder is not distended. No CVA tenderness is present. Tena with bloody urine.    Extremities: No edema is noted. No deformities are present.    Neurological: Cognition is normal. Tone, power and sensation are normal. Gait is steady.                              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     Questionable soft tissue filling defect in the distal right ureter.    Retroperitoneal lymphadenopathy and small volume abdominal and pelvic   free fluid.    Lytic bone lesions concerning for metastases, including a left iliac bone   lesion with overlying cortical irregularity.    Trace right and small left pleural effusion with associated atelectasis.   Patchy groundglass and tree-in-bud opacities in the right lung which may   be infectious or associated with aspiration given the distended   fluid-filled esophagus.    Unchanged splenomegaly.

## 2018-08-25 NOTE — PROGRESS NOTE ADULT - SUBJECTIVE AND OBJECTIVE BOX
chief complaint : hematuria        SUBJECTIVE / OVERNIGHT EVENTS: pt denies chest pain, shortness of breath, nausea,  v    MEDICATIONS  (STANDING):  cefTRIAXone   IVPB 1 Gram(s) IV Intermittent every 24 hours  docusate sodium 100 milliGRAM(s) Oral three times a day  hydrALAZINE 25 milliGRAM(s) Oral every 8 hours  pantoprazole    Tablet 40 milliGRAM(s) Oral before breakfast  predniSONE   Tablet 10 milliGRAM(s) Oral daily  sodium chloride 0.45%. 1000 milliLiter(s) (50 mL/Hr) IV Continuous <Continuous>    MEDICATIONS  (PRN):  polyethylene glycol 3350 17 Gram(s) Oral at bedtime PRN Constipation    Vital Signs Last 24 Hrs  T(C): 37.2 (25 Aug 2018 17:14), Max: 37.2 (25 Aug 2018 17:14)  T(F): 98.9 (25 Aug 2018 17:14), Max: 98.9 (25 Aug 2018 17:14)  HR: 108 (25 Aug 2018 17:14) (102 - 116)  BP: 114/61 (25 Aug 2018 17:14) (106/60 - 168/86)  BP(mean): --  RR: 18 (25 Aug 2018 17:14) (17 - 18)  SpO2: 95% (25 Aug 2018 17:14) (95% - 98%)    Constitutional: No fever, fatigue  Skin: No rash.  Eyes: No recent vision problems or eye pain.  ENT: No congestion, ear pain, or sore throat.  Cardiovascular: No chest pain or palpation.  Respiratory: No cough, shortness of breath, congestion, or wheezing.  Gastrointestinal: No abdominal pain, nausea, vomiting, or diarrhea.  Genitourinary: No dysuria.  Musculoskeletal: No joint swelling.  Neurologic: No headache.    PHYSICAL EXAM:  GENERAL: NAD  EYES: EOMI, PERRLA  NECK: Supple, No JVD  CHEST/LUNG: dec breath sounds at bases   HEART:  S1 , S2 +  ABDOMEN: sof,t bs+  EXTREMITIES:  trace edema+  NEUROLOGY:alert awake     zimmerman = blood tinged urine +      LABS:      136  |  98  |  33<H>  ----------------------------<  107<H>  4.1   |  21<L>  |  1.50<H>    Ca    9.0      24 Aug 2018 06:30  Phos  3.1     -      Creatinine Trend: 1.50 <--, 1.47 <--, 1.56 <--                        11.4   13.46 )-----------( 141      ( 24 Aug 2018 06:30 )             35.4     Urine Studies:  Urinalysis Basic - ( 23 Aug 2018 01:42 )    Color: RED / Appearance: HAZY / S.018 / pH: 6.5  Gluc: NEGATIVE / Ketone: TRACE  / Bili: NEGATIVE / Urobili: NORMAL   Blood: LARGE / Protein: 300 / Nitrite: POSITIVE   Leuk Esterase: SMALL / RBC: >50 / WBC 20-30   Sq Epi:  / Non Sq Epi: FEW / Bacteria: LARGE      Creatinine, Random Urine: 35.00 mg/dL ( @ 18:00)

## 2018-08-26 LAB
BASOPHILS # BLD AUTO: 0.04 K/UL — SIGNIFICANT CHANGE UP (ref 0–0.2)
BASOPHILS NFR BLD AUTO: 0.3 % — SIGNIFICANT CHANGE UP (ref 0–2)
BASOPHILS NFR SPEC: 0 % — SIGNIFICANT CHANGE UP (ref 0–2)
BLASTS # FLD: 0 % — SIGNIFICANT CHANGE UP (ref 0–0)
BUN SERPL-MCNC: 36 MG/DL — HIGH (ref 7–23)
CALCIUM SERPL-MCNC: 9.1 MG/DL — SIGNIFICANT CHANGE UP (ref 8.4–10.5)
CHLORIDE SERPL-SCNC: 99 MMOL/L — SIGNIFICANT CHANGE UP (ref 98–107)
CO2 SERPL-SCNC: 23 MMOL/L — SIGNIFICANT CHANGE UP (ref 22–31)
CREAT SERPL-MCNC: 1.67 MG/DL — HIGH (ref 0.5–1.3)
DACRYOCYTES BLD QL SMEAR: SLIGHT — SIGNIFICANT CHANGE UP
ELLIPTOCYTES BLD QL SMEAR: SLIGHT — SIGNIFICANT CHANGE UP
EOSINOPHIL # BLD AUTO: 0.28 K/UL — SIGNIFICANT CHANGE UP (ref 0–0.5)
EOSINOPHIL NFR BLD AUTO: 2 % — SIGNIFICANT CHANGE UP (ref 0–6)
EOSINOPHIL NFR FLD: 3.5 % — SIGNIFICANT CHANGE UP (ref 0–6)
GIANT PLATELETS BLD QL SMEAR: PRESENT — SIGNIFICANT CHANGE UP
GLUCOSE SERPL-MCNC: 99 MG/DL — SIGNIFICANT CHANGE UP (ref 70–99)
HCT VFR BLD CALC: 35.5 % — LOW (ref 39–50)
HGB BLD-MCNC: 11.2 G/DL — LOW (ref 13–17)
IMM GRANULOCYTES # BLD AUTO: 0.32 # — SIGNIFICANT CHANGE UP
IMM GRANULOCYTES NFR BLD AUTO: 2.3 % — HIGH (ref 0–1.5)
LYMPHOCYTES # BLD AUTO: 1.17 K/UL — SIGNIFICANT CHANGE UP (ref 1–3.3)
LYMPHOCYTES # BLD AUTO: 8.5 % — LOW (ref 13–44)
LYMPHOCYTES NFR SPEC AUTO: 1.8 % — LOW (ref 13–44)
MAGNESIUM SERPL-MCNC: 1.9 MG/DL — SIGNIFICANT CHANGE UP (ref 1.6–2.6)
MCHC RBC-ENTMCNC: 25.7 PG — LOW (ref 27–34)
MCHC RBC-ENTMCNC: 31.5 % — LOW (ref 32–36)
MCV RBC AUTO: 81.4 FL — SIGNIFICANT CHANGE UP (ref 80–100)
METAMYELOCYTES # FLD: 0 % — SIGNIFICANT CHANGE UP (ref 0–1)
MONOCYTES # BLD AUTO: 2.17 K/UL — HIGH (ref 0–0.9)
MONOCYTES NFR BLD AUTO: 15.8 % — HIGH (ref 2–14)
MONOCYTES NFR BLD: 11.5 % — HIGH (ref 2–9)
MYELOCYTES NFR BLD: 0 % — SIGNIFICANT CHANGE UP (ref 0–0)
NEUTROPHIL AB SER-ACNC: 81.4 % — HIGH (ref 43–77)
NEUTROPHILS # BLD AUTO: 9.74 K/UL — HIGH (ref 1.8–7.4)
NEUTROPHILS NFR BLD AUTO: 71.1 % — SIGNIFICANT CHANGE UP (ref 43–77)
NEUTS BAND # BLD: 0 % — SIGNIFICANT CHANGE UP (ref 0–6)
NRBC # FLD: 0 — SIGNIFICANT CHANGE UP
OTHER - HEMATOLOGY %: 0 — SIGNIFICANT CHANGE UP
PHOSPHATE SERPL-MCNC: 3 MG/DL — SIGNIFICANT CHANGE UP (ref 2.5–4.5)
PLATELET # BLD AUTO: 158 K/UL — SIGNIFICANT CHANGE UP (ref 150–400)
PLATELET COUNT - ESTIMATE: NORMAL — SIGNIFICANT CHANGE UP
PMV BLD: 12 FL — SIGNIFICANT CHANGE UP (ref 7–13)
POIKILOCYTOSIS BLD QL AUTO: SLIGHT — SIGNIFICANT CHANGE UP
POTASSIUM SERPL-MCNC: 3.6 MMOL/L — SIGNIFICANT CHANGE UP (ref 3.5–5.3)
POTASSIUM SERPL-SCNC: 3.6 MMOL/L — SIGNIFICANT CHANGE UP (ref 3.5–5.3)
PROMYELOCYTES # FLD: 0 % — SIGNIFICANT CHANGE UP (ref 0–0)
RBC # BLD: 4.36 M/UL — SIGNIFICANT CHANGE UP (ref 4.2–5.8)
RBC # FLD: 19.4 % — HIGH (ref 10.3–14.5)
REVIEW TO FOLLOW: YES — SIGNIFICANT CHANGE UP
SMUDGE CELLS # BLD: PRESENT — SIGNIFICANT CHANGE UP
SODIUM SERPL-SCNC: 136 MMOL/L — SIGNIFICANT CHANGE UP (ref 135–145)
VARIANT LYMPHS # BLD: 1.8 % — SIGNIFICANT CHANGE UP
WBC # BLD: 13.72 K/UL — HIGH (ref 3.8–10.5)
WBC # FLD AUTO: 13.72 K/UL — HIGH (ref 3.8–10.5)

## 2018-08-26 RX ORDER — MORPHINE SULFATE 50 MG/1
0.5 CAPSULE, EXTENDED RELEASE ORAL ONCE
Qty: 0 | Refills: 0 | Status: DISCONTINUED | OUTPATIENT
Start: 2018-08-26 | End: 2018-08-26

## 2018-08-26 RX ORDER — LANOLIN ALCOHOL/MO/W.PET/CERES
3 CREAM (GRAM) TOPICAL AT BEDTIME
Qty: 0 | Refills: 0 | Status: DISCONTINUED | OUTPATIENT
Start: 2018-08-26 | End: 2018-08-30

## 2018-08-26 RX ORDER — LIDOCAINE 4 G/100G
1 CREAM TOPICAL DAILY
Qty: 0 | Refills: 0 | Status: DISCONTINUED | OUTPATIENT
Start: 2018-08-26 | End: 2018-08-30

## 2018-08-26 RX ORDER — SODIUM CHLORIDE 9 MG/ML
1000 INJECTION, SOLUTION INTRAVENOUS
Qty: 0 | Refills: 0 | Status: DISCONTINUED | OUTPATIENT
Start: 2018-08-26 | End: 2018-08-30

## 2018-08-26 RX ORDER — OXYCODONE HYDROCHLORIDE 5 MG/1
5 TABLET ORAL ONCE
Qty: 0 | Refills: 0 | Status: DISCONTINUED | OUTPATIENT
Start: 2018-08-26 | End: 2018-08-26

## 2018-08-26 RX ADMIN — SODIUM CHLORIDE 50 MILLILITER(S): 9 INJECTION, SOLUTION INTRAVENOUS at 22:25

## 2018-08-26 RX ADMIN — OXYCODONE HYDROCHLORIDE 5 MILLIGRAM(S): 5 TABLET ORAL at 06:20

## 2018-08-26 RX ADMIN — Medication 25 MILLIGRAM(S): at 02:34

## 2018-08-26 RX ADMIN — LIDOCAINE 1 PATCH: 4 CREAM TOPICAL at 13:18

## 2018-08-26 RX ADMIN — SODIUM CHLORIDE 50 MILLILITER(S): 9 INJECTION, SOLUTION INTRAVENOUS at 13:40

## 2018-08-26 RX ADMIN — CEFTRIAXONE 100 GRAM(S): 500 INJECTION, POWDER, FOR SOLUTION INTRAMUSCULAR; INTRAVENOUS at 02:29

## 2018-08-26 RX ADMIN — Medication 100 MILLIGRAM(S): at 22:25

## 2018-08-26 RX ADMIN — Medication 3 MILLIGRAM(S): at 22:25

## 2018-08-26 RX ADMIN — OXYCODONE HYDROCHLORIDE 5 MILLIGRAM(S): 5 TABLET ORAL at 06:50

## 2018-08-26 RX ADMIN — MORPHINE SULFATE 0.5 MILLIGRAM(S): 50 CAPSULE, EXTENDED RELEASE ORAL at 13:14

## 2018-08-26 RX ADMIN — MORPHINE SULFATE 0.5 MILLIGRAM(S): 50 CAPSULE, EXTENDED RELEASE ORAL at 13:40

## 2018-08-26 RX ADMIN — Medication 100 MILLIGRAM(S): at 06:11

## 2018-08-26 RX ADMIN — Medication 25 MILLIGRAM(S): at 17:23

## 2018-08-26 RX ADMIN — Medication 100 MILLIGRAM(S): at 13:18

## 2018-08-26 RX ADMIN — PANTOPRAZOLE SODIUM 40 MILLIGRAM(S): 20 TABLET, DELAYED RELEASE ORAL at 06:11

## 2018-08-26 RX ADMIN — Medication 10 MILLIGRAM(S): at 06:11

## 2018-08-26 RX ADMIN — Medication 25 MILLIGRAM(S): at 09:28

## 2018-08-26 NOTE — PROGRESS NOTE ADULT - SUBJECTIVE AND OBJECTIVE BOX
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    HEALTH ISSUES - PROBLEM Dx:  Leukocytosis: Leukocytosis  Fever: Fever  Sepsis due to urinary tract infection: Sepsis due to urinary tract infection  Lower extremity edema: Lower extremity edema  Proteinuria: Proteinuria  Hypertension: Hypertension  Hematuria: Hematuria  UTI (urinary tract infection): UTI (urinary tract infection)  Renal insufficiency: Renal insufficiency        MEDICATIONS  (STANDING):  cefTRIAXone   IVPB 1 Gram(s) IV Intermittent every 24 hours  docusate sodium 100 milliGRAM(s) Oral three times a day  hydrALAZINE 25 milliGRAM(s) Oral every 8 hours  lidocaine   Patch 1 Patch Transdermal daily  pantoprazole    Tablet 40 milliGRAM(s) Oral before breakfast  sodium chloride 0.45%. 1000 milliLiter(s) (50 mL/Hr) IV Continuous <Continuous>    MEDICATIONS  (PRN):  polyethylene glycol 3350 17 Gram(s) Oral at bedtime PRN Constipation    Vital Signs Last 24 Hrs  T(C): 37.1 (26 Aug 2018 09:34), Max: 37.2 (25 Aug 2018 17:14)  T(F): 98.8 (26 Aug 2018 09:34), Max: 98.9 (25 Aug 2018 17:14)  HR: 111 (26 Aug 2018 09:25) (104 - 116)  BP: 119/66 (26 Aug 2018 09:25) (112/60 - 130/63)  BP(mean): --  RR: 17 (26 Aug 2018 09:25) (17 - 18)  SpO2: 96% (26 Aug 2018 09:25) (95% - 96%)  Daily     Daily     PHYSICAL EXAM:  Constitutional: He appears comfortable and not distressed. Not diaphoretic.    Respiratory: The lungs are clear to auscultation. No dullness and expansion is normal.    Cardiovascular: S1 and S2 are normal. No mummurs, rubs or gallops are present.    Gastrointestinal: The abdomen is soft. No tenderness is present. No masses are present. Bowel sounds are normal.    Genitourinary: The bladder is not distended. No CVA tenderness is present. Tena with bloody urine.    Extremities: No edema is noted. No deformities are present.    Neurological: Cognition is normal.                             11.2   13.72 )-----------( 158      ( 26 Aug 2018 07:00 )             35.5     08-26    136  |  99  |  36<H>  ----------------------------<  99  3.6   |  23  |  1.67<H>    Ca    9.1      26 Aug 2018 07:00  Phos  3.0     08-26  Mg     1.9     08-26

## 2018-08-26 NOTE — PROGRESS NOTE ADULT - ASSESSMENT
1.	CKD.  2.	Bob, possible pre-renal.  3.	Bladder Ca.    PLAN:  1.	Hydrate.  2.	Follow up BMP.  3.	Urine Na.

## 2018-08-26 NOTE — PROGRESS NOTE ADULT - SUBJECTIVE AND OBJECTIVE BOX
Patient seen and examined.  Tena catheter draining well.     T(F): 98.8, Max: 98.9 (08-25-18 @ 17:14)  HR: 108  BP: 112/60  SpO2: 95%    OUT:    Indwelling Catheter - Urethral: 700 mL  Total OUT: 700 mL    Gen NAD, resting   Tena in place draining clear fanny urine    08-26 @ 07:00  WBC 13.72 / Hct 35.5  / SCr --       08-25 @ 11:25  WBC 14.57 / Hct 36.9  / SCr --       URINE CATHETER  08-23--neg    BLOOD VENOUS  08-23--NGTD Patient seen and examined.  Tena catheter draining well.     T(F): 98.8, Max: 98.9 (08-25-18 @ 17:14)  HR: 108  BP: 112/60  SpO2: 95%    OUT:    Indwelling Catheter - Urethral: 700 mL  Total OUT: 700 mL    Gen NAD, resting   Tena in place draining clear fanny urine    08-26 @ 07:00  WBC 13.72 / Hct 35.5  / SCr --       08-25 @ 11:25  WBC 14.57 / Hct 36.9  / SCr --       URINE CATHETER  08-23--neg    BLOOD VENOUS  08-23--NGTD    CT IMPRESSION:   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  Questionable soft tissue filling defect in the distal right ureter.  Retroperitoneal lymphadenopathy and small volume abdominal and pelvic   free fluid.  Lytic bone lesions concerning for metastases, including a left iliac bone   lesion with overlying cortical irregularity.  Trace right and small left pleural effusion with associated atelectasis.   Patchy groundglass and tree-in-bud opacities in the right lung which may   be infectious or associated with aspiration given the distended   fluid-filled esophagus.  Unchanged splenomegaly.

## 2018-08-26 NOTE — CHART NOTE - NSCHARTNOTEFT_GEN_A_CORE
Called to evaluate patient for swollen glans.  Patient states he is not circumcised.  Edema squeezed out of glans, foreskin successfully reduced over glans.  Tena remains in place.

## 2018-08-26 NOTE — PROGRESS NOTE ADULT - SUBJECTIVE AND OBJECTIVE BOX
chief complaint : hematuria        SUBJECTIVE / OVERNIGHT EVENTS: pt denies chest pain, shortness of breath, nausea,  v    MEDICATIONS  (STANDING):  cefTRIAXone   IVPB 1 Gram(s) IV Intermittent every 24 hours  docusate sodium 100 milliGRAM(s) Oral three times a day  hydrALAZINE 25 milliGRAM(s) Oral every 8 hours  lidocaine   Patch 1 Patch Transdermal daily  pantoprazole    Tablet 40 milliGRAM(s) Oral before breakfast  sodium chloride 0.45%. 1000 milliLiter(s) (50 mL/Hr) IV Continuous <Continuous>    MEDICATIONS  (PRN):  melatonin 3 milliGRAM(s) Oral at bedtime PRN Insomnia  polyethylene glycol 3350 17 Gram(s) Oral at bedtime PRN Constipation    Vital Signs Last 24 Hrs  T(C): 36.8 (26 Aug 2018 22:02), Max: 37.2 (26 Aug 2018 02:32)  T(F): 98.3 (26 Aug 2018 22:02), Max: 98.9 (26 Aug 2018 02:32)  HR: 109 (26 Aug 2018 22:02) (102 - 111)  BP: 129/66 (26 Aug 2018 22:02) (107/54 - 129/66)  BP(mean): --  RR: 17 (26 Aug 2018 22:02) (17 - 18)  SpO2: 94% (26 Aug 2018 22:02) (94% - 96%)    Constitutional: No fever, fatigue  Skin: No rash.  Eyes: No recent vision problems or eye pain.  ENT: No congestion, ear pain, or sore throat.  Cardiovascular: No chest pain or palpation.  Respiratory: No cough, shortness of breath, congestion, or wheezing.  Gastrointestinal: No abdominal pain, nausea, vomiting, or diarrhea.  Genitourinary: No dysuria.  Musculoskeletal: No joint swelling.  Neurologic: No headache.    PHYSICAL EXAM:  GENERAL: NAD  EYES: EOMI, PERRLA  NECK: Supple, No JVD  CHEST/LUNG: dec breath sounds at bases   HEART:  S1 , S2 +  ABDOMEN: sof,t bs+  EXTREMITIES:  trace edema+  NEUROLOGY:alert awake     zimmerman = blood tinged urine +      LABS:      136  |  99  |  36<H>  ----------------------------<  99  3.6   |  23  |  1.67<H>    Ca    9.1      26 Aug 2018 07:00  Phos  3.0       Mg     1.9           Creatinine Trend: 1.67 <--, 1.50 <--, 1.47 <--, 1.56 <--                        11.2   13.72 )-----------( 158      ( 26 Aug 2018 07:00 )             35.5     Urine Studies:  Urinalysis Basic - ( 23 Aug 2018 01:42 )    Color: RED / Appearance: HAZY / S.018 / pH: 6.5  Gluc: NEGATIVE / Ketone: TRACE  / Bili: NEGATIVE / Urobili: NORMAL   Blood: LARGE / Protein: 300 / Nitrite: POSITIVE   Leuk Esterase: SMALL / RBC: >50 / WBC 20-30   Sq Epi:  / Non Sq Epi: FEW / Bacteria: LARGE      Creatinine, Random Urine: 35.00 mg/dL ( @ 18:00)

## 2018-08-27 ENCOUNTER — MESSAGE (OUTPATIENT)
Age: 83
End: 2018-08-27

## 2018-08-27 LAB
BASOPHILS # BLD AUTO: 0.04 K/UL — SIGNIFICANT CHANGE UP (ref 0–0.2)
BASOPHILS NFR BLD AUTO: 0.3 % — SIGNIFICANT CHANGE UP (ref 0–2)
BUN SERPL-MCNC: 43 MG/DL — HIGH (ref 7–23)
CALCIUM SERPL-MCNC: 9 MG/DL — SIGNIFICANT CHANGE UP (ref 8.4–10.5)
CHLORIDE SERPL-SCNC: 99 MMOL/L — SIGNIFICANT CHANGE UP (ref 98–107)
CO2 SERPL-SCNC: 24 MMOL/L — SIGNIFICANT CHANGE UP (ref 22–31)
CREAT SERPL-MCNC: 1.76 MG/DL — HIGH (ref 0.5–1.3)
EOSINOPHIL # BLD AUTO: 0.18 K/UL — SIGNIFICANT CHANGE UP (ref 0–0.5)
EOSINOPHIL NFR BLD AUTO: 1.6 % — SIGNIFICANT CHANGE UP (ref 0–6)
GLUCOSE SERPL-MCNC: 108 MG/DL — HIGH (ref 70–99)
HCT VFR BLD CALC: 35.6 % — LOW (ref 39–50)
HGB BLD-MCNC: 11.5 G/DL — LOW (ref 13–17)
IMM GRANULOCYTES # BLD AUTO: 0.24 # — SIGNIFICANT CHANGE UP
IMM GRANULOCYTES NFR BLD AUTO: 2.1 % — HIGH (ref 0–1.5)
LYMPHOCYTES # BLD AUTO: 1.03 K/UL — SIGNIFICANT CHANGE UP (ref 1–3.3)
LYMPHOCYTES # BLD AUTO: 8.9 % — LOW (ref 13–44)
MAGNESIUM SERPL-MCNC: 2 MG/DL — SIGNIFICANT CHANGE UP (ref 1.6–2.6)
MCHC RBC-ENTMCNC: 26.7 PG — LOW (ref 27–34)
MCHC RBC-ENTMCNC: 32.3 % — SIGNIFICANT CHANGE UP (ref 32–36)
MCV RBC AUTO: 82.6 FL — SIGNIFICANT CHANGE UP (ref 80–100)
MONOCYTES # BLD AUTO: 1.63 K/UL — HIGH (ref 0–0.9)
MONOCYTES NFR BLD AUTO: 14.1 % — HIGH (ref 2–14)
NEUTROPHILS # BLD AUTO: 8.44 K/UL — HIGH (ref 1.8–7.4)
NEUTROPHILS NFR BLD AUTO: 73 % — SIGNIFICANT CHANGE UP (ref 43–77)
NRBC # FLD: 0 — SIGNIFICANT CHANGE UP
PHOSPHATE SERPL-MCNC: 3.1 MG/DL — SIGNIFICANT CHANGE UP (ref 2.5–4.5)
PLATELET # BLD AUTO: 177 K/UL — SIGNIFICANT CHANGE UP (ref 150–400)
PMV BLD: 12.4 FL — SIGNIFICANT CHANGE UP (ref 7–13)
POTASSIUM SERPL-MCNC: 3.8 MMOL/L — SIGNIFICANT CHANGE UP (ref 3.5–5.3)
POTASSIUM SERPL-SCNC: 3.8 MMOL/L — SIGNIFICANT CHANGE UP (ref 3.5–5.3)
RBC # BLD: 4.31 M/UL — SIGNIFICANT CHANGE UP (ref 4.2–5.8)
RBC # FLD: 19.7 % — HIGH (ref 10.3–14.5)
SODIUM SERPL-SCNC: 135 MMOL/L — SIGNIFICANT CHANGE UP (ref 135–145)
WBC # BLD: 11.56 K/UL — HIGH (ref 3.8–10.5)
WBC # FLD AUTO: 11.56 K/UL — HIGH (ref 3.8–10.5)

## 2018-08-27 PROCEDURE — 99232 SBSQ HOSP IP/OBS MODERATE 35: CPT

## 2018-08-27 RX ORDER — FUROSEMIDE 40 MG
40 TABLET ORAL ONCE
Qty: 0 | Refills: 0 | Status: COMPLETED | OUTPATIENT
Start: 2018-08-27 | End: 2018-08-27

## 2018-08-27 RX ADMIN — Medication 25 MILLIGRAM(S): at 10:43

## 2018-08-27 RX ADMIN — Medication 100 MILLIGRAM(S): at 13:31

## 2018-08-27 RX ADMIN — LIDOCAINE 1 PATCH: 4 CREAM TOPICAL at 13:32

## 2018-08-27 RX ADMIN — CEFTRIAXONE 100 GRAM(S): 500 INJECTION, POWDER, FOR SOLUTION INTRAMUSCULAR; INTRAVENOUS at 00:24

## 2018-08-27 RX ADMIN — Medication 25 MILLIGRAM(S): at 01:19

## 2018-08-27 RX ADMIN — PANTOPRAZOLE SODIUM 40 MILLIGRAM(S): 20 TABLET, DELAYED RELEASE ORAL at 06:44

## 2018-08-27 RX ADMIN — Medication 100 MILLIGRAM(S): at 22:29

## 2018-08-27 RX ADMIN — SODIUM CHLORIDE 50 MILLILITER(S): 9 INJECTION, SOLUTION INTRAVENOUS at 00:24

## 2018-08-27 RX ADMIN — Medication 100 MILLIGRAM(S): at 06:44

## 2018-08-27 RX ADMIN — LIDOCAINE 1 PATCH: 4 CREAM TOPICAL at 01:18

## 2018-08-27 RX ADMIN — SODIUM CHLORIDE 50 MILLILITER(S): 9 INJECTION, SOLUTION INTRAVENOUS at 10:43

## 2018-08-27 RX ADMIN — Medication 40 MILLIGRAM(S): at 23:24

## 2018-08-27 RX ADMIN — Medication 25 MILLIGRAM(S): at 18:07

## 2018-08-27 NOTE — PROVIDER CONTACT NOTE (OTHER) - REASON
Patient c/o feeling weak, refusing to attempt transfer OOB with assistance.
increased heart rate
zimmerman catheter leaking
penile swelling

## 2018-08-27 NOTE — PROGRESS NOTE ADULT - SUBJECTIVE AND OBJECTIVE BOX
chief complaint : hematuria        SUBJECTIVE / OVERNIGHT EVENTS: pt denies chest pain, shortness of breath, nausea,  v    MEDICATIONS  (STANDING):  cefTRIAXone   IVPB 1 Gram(s) IV Intermittent every 24 hours  docusate sodium 100 milliGRAM(s) Oral three times a day  hydrALAZINE 25 milliGRAM(s) Oral every 8 hours  lidocaine   Patch 1 Patch Transdermal daily  pantoprazole    Tablet 40 milliGRAM(s) Oral before breakfast  sodium chloride 0.45%. 1000 milliLiter(s) (50 mL/Hr) IV Continuous <Continuous>    MEDICATIONS  (PRN):  melatonin 3 milliGRAM(s) Oral at bedtime PRN Insomnia  polyethylene glycol 3350 17 Gram(s) Oral at bedtime PRN Constipation    Vital Signs Last 24 Hrs  T(C): 36.6 (27 Aug 2018 18:07), Max: 37.1 (27 Aug 2018 10:40)  T(F): 97.8 (27 Aug 2018 18:07), Max: 98.7 (27 Aug 2018 10:40)  HR: 107 (27 Aug 2018 18:07) (97 - 109)  BP: 141/81 (27 Aug 2018 18:07) (113/58 - 141/81)  BP(mean): --  RR: 19 (27 Aug 2018 18:07) (17 - 19)  SpO2: 94% (27 Aug 2018 18:07) (91% - 95%)    Constitutional: No fever, fatigue  Skin: No rash.  Eyes: No recent vision problems or eye pain.  ENT: No congestion, ear pain, or sore throat.  Cardiovascular: No chest pain or palpation.  Respiratory: No cough, shortness of breath, congestion, or wheezing.  Gastrointestinal: No abdominal pain, nausea, vomiting, or diarrhea.  Genitourinary: No dysuria.  Musculoskeletal: No joint swelling.  Neurologic: No headache.    PHYSICAL EXAM:  GENERAL: NAD  EYES: EOMI, PERRLA  NECK: Supple, No JVD  CHEST/LUNG: dec breath sounds at bases   HEART:  S1 , S2 +  ABDOMEN: sof,t bs+  EXTREMITIES:  trace edema+  NEUROLOGY:alert awake     zimmerman = blood tinged urine +      LABS:      135  |  99  |  43<H>  ----------------------------<  108<H>  3.8   |  24  |  1.76<H>    Ca    9.0      27 Aug 2018 05:53  Phos  3.1       Mg     2.0           Creatinine Trend: 1.76 <--, 1.67 <--, 1.50 <--, 1.47 <--, 1.56 <--                        11.5   11.56 )-----------( 177      ( 27 Aug 2018 05:53 )             35.6     Urine Studies:  Urinalysis Basic - ( 23 Aug 2018 01:42 )    Color: RED / Appearance: HAZY / S.018 / pH: 6.5  Gluc: NEGATIVE / Ketone: TRACE  / Bili: NEGATIVE / Urobili: NORMAL   Blood: LARGE / Protein: 300 / Nitrite: POSITIVE   Leuk Esterase: SMALL / RBC: >50 / WBC 20-30   Sq Epi:  / Non Sq Epi: FEW / Bacteria: LARGE      Creatinine, Random Urine: 35.00 mg/dL ( @ 18:00)

## 2018-08-27 NOTE — PHYSICAL THERAPY INITIAL EVALUATION ADULT - GAIT DISTANCE, PT EVAL
Pt deferring further ambulation despite encouragement. Pt. wishes to sit in chair and do exercise./bed to chair/5 feet

## 2018-08-27 NOTE — SWALLOW BEDSIDE ASSESSMENT ADULT - COMMENTS
Patient is an 86 year old male with PMHx of HTN, remote hx colon ca, bladder tumor s/p recent tumor resection few month ago per patient, present with difficulty of urination sudden onset penile bleeding and pain, and fever in ED. CT Chest/Abdomen/Pelvis 8/24/18 showed "Trace right and small left pleural effusion with associated atelectasis. Patchy groundglass and tree-in-bud opacities in the right lung which may be infectious or associated with aspiration given the distended fluid-filled esophagus."    Patient was received awake, alert and cooperative this AM. Supplemental O2 via NC in place. Recommendations discussed with ADS (michael Paniagua #[id number]).

## 2018-08-27 NOTE — PROVIDER CONTACT NOTE (OTHER) - ACTION/TREATMENT ORDERED:
Advised call Urology. Urology Dr. Pollock notified. Pt was evaluated at bedside, swelling reduced, foreskin returned to baseline by urology. Monitoring to continue.
Orders to follow.
Urology resident Dr. Pollock came to bedside to evaluate patient, patient continues to refuse CBI despite education. Pt verbalizes understanding of risk of refusal via teach back method.

## 2018-08-27 NOTE — PROGRESS NOTE ADULT - ASSESSMENT
86yoM with hx bladder CA s/p TURBT Oct 2017, now with gross hematuria    -UCx neg, BCx NGTD  -Monitor color  -Creatinine increasing, follow up nephrology recs  -CT Urogram with tumor in bladder and collecting system, likely mets to LNs and bone  -Please consult medical oncology for evaluation and management of metastatic bladder cancer  -[phone]

## 2018-08-27 NOTE — PHYSICAL THERAPY INITIAL EVALUATION ADULT - GENERAL OBSERVATIONS, REHAB EVAL
Consult received, chart reviewed. Patient received supine in bed, NAD, +O2. Patient agreed to EVALUATION from Physical Therapist.

## 2018-08-27 NOTE — PHYSICAL THERAPY INITIAL EVALUATION ADULT - CRITERIA FOR SKILLED THERAPEUTIC INTERVENTIONS
risk reduction/prevention/therapy frequency/anticipated discharge recommendation/impairments found/rehab potential/predicted duration of therapy intervention

## 2018-08-27 NOTE — PROGRESS NOTE ADULT - PROBLEM SELECTOR PLAN 1
patient denied hx of kidney disease, however review from [facility] record showed scr. ~1.3-1.4 in 2017.   Pt with hx of long stand HTN   renal function worsening, likely sec to contrast study done 8/24. likely acute on ckd.   s/p zimmerman in place  on spironolactone at home, hold for now   continue to monitor bmp  phos level is ok  pending PTH level MARYELLEN on CKD  scr. ~1.3-1.4 in 2017.   Pt with hx of long stand HTN   renal function worsening, likely sec to contrast study done 8/24 vs renal mass infiltration   s/p zimmerman in place  on spironolactone at home, hold for now   continue to monitor bmp  phos level is ok  pending PTH level

## 2018-08-27 NOTE — PHYSICAL THERAPY INITIAL EVALUATION ADULT - ADDITIONAL COMMENTS
Pt reports that he is independent with all mobility without use of Assistive Device. Please confirm this with SW/CM as pt. appears confused at times.     Patient left sitting in chair bedside, NAD, +O2 via NC, +chair alarm, RN aware.

## 2018-08-27 NOTE — PROGRESS NOTE ADULT - SUBJECTIVE AND OBJECTIVE BOX
Patient seen and examined.  Spoke with patient about CT findings of likely metastatic disease.  Will want to talk with son Lalito.    T(F): 98.3, Max: 98.8 (08-26-18 @ 09:34)  HR: 97  BP: 119/59  SpO2: 94%    Gen NAD  Abd Soft, NT   Tena in place draining clear fanny urine, foreskin reduced over glans    08-26 @ 07:00  WBC 13.72 / Hct 35.5  / SCr 1.67     08-25 @ 11:25  WBC 14.57 / Hct 36.9  / SCr --       URINE CATHETER  08-23--neg    BLOOD VENOUS  08-23--NGTD

## 2018-08-27 NOTE — SWALLOW BEDSIDE ASSESSMENT ADULT - SWALLOW EVAL: DIAGNOSIS
Patient demonstrates oropharyngeal dysphagia characterized by increased mastication time and delayed bolus collection and anterior-posterior transfer of regular solids. Functional oral management noted for puree, mechanical soft, nectar-thick and thin liquids. The pharyngeal stage is characterized by a timely pharyngeal trigger with reduced hyolaryngeal excursion suspected upon digital palpation. No overt, clinical s/s of laryngeal penetration/aspiration noted across trials, however given concern for aspiration on most recent chest imaging, patient deemed candidate for modified texture diet with f/u objective testing to r/o silent aspiration.

## 2018-08-27 NOTE — SWALLOW BEDSIDE ASSESSMENT ADULT - SWALLOW EVAL: RECOMMENDED DIET
1) Dysphagia 2 (Mechanical Soft) with Nectar-Thick Liquids; 2) Cinesophagram to r/o silent aspiration

## 2018-08-27 NOTE — PROVIDER CONTACT NOTE (OTHER) - RECOMMENDATIONS
bedside evaluation
Monitor pt.
Consider PT/OT eval.
Requested for Urology to see him and offer CBI again

## 2018-08-27 NOTE — PROGRESS NOTE ADULT - PROBLEM SELECTOR PLAN 3
recent bladder ca s/p resection  CT Urogram with tumor in bladder and collecting system, likely mets to LNs and bone  s/p zimmerman  f/u .  palliative on board recent bladder ca s/p resection  CT Urogram with tumor in bladder and collecting system, likely mets to LNs and bone  s/p zimmerman  f/u  and medical oncology  palliative on board

## 2018-08-27 NOTE — PHYSICAL THERAPY INITIAL EVALUATION ADULT - PLANNED THERAPY INTERVENTIONS, PT EVAL
balance training/bed mobility training/transfer training/postural re-education/strengthening/gait training

## 2018-08-27 NOTE — CHART NOTE - NSCHARTNOTEFT_GEN_A_CORE
pt appears congested , crackle s+ on exam , will give one dose of lasix and monitor pts  volume status closely

## 2018-08-27 NOTE — PROVIDER CONTACT NOTE (OTHER) - SITUATION
Patient c/o feeling weak, refusing to attempt transfer OOB with assistance.
Pt ambulating and washing up. Base line afibb/rapid on tele.
Pt has zimmerman catheter that started leaking. Pt continuously refuses to get irrigation catheter that will not leak and flush the blood from his bladder .
Mild swelling to glans and distal foreskin of penis

## 2018-08-27 NOTE — SWALLOW BEDSIDE ASSESSMENT ADULT - ASR SWALLOW ASPIRATION MONITOR
oral hygiene/gurgly voice/fever/pneumonia/throat clearing/upper respiratory infection/position upright (90Y)/change of breathing pattern/cough

## 2018-08-27 NOTE — PROVIDER CONTACT NOTE (OTHER) - ASSESSMENT
Pt is asymptomatic.
Patient remains alert, c/o feeling weak, refusing to attempt to get OOB to chair or ambulate, despite explanation of risks and benefits.
Pt stable, not complaining of pain at the moment . Tena noted to be leaking and incontinent pad was saturated. Urine is noted to be blood tinged.
Mild swelling to glans and distal foreskin of penis, unable to return foreskin to baseline. Tena remaining patent, draining red urine with sediment.

## 2018-08-27 NOTE — PROGRESS NOTE ADULT - SUBJECTIVE AND OBJECTIVE BOX
[facility]   MD OBBBY FREGOSO MD ANGELA WONG, PA    TEL:  OFFICE: [phone]  DR VELASQUEZ CELL: [phone]  DR. GARCIA CELL: [phone]  SOCRATES BECERRA CELL: [phone]        Patient is a 86y old  Male who presents with a chief complaint of hematuria (25 Aug 2018 17:09)      Patient seen and examined at bedside. No chest pain/sob    VITALS:  T(F): 98.7 (08-27-18 @ 10:40), Max: 98.8 (08-26-18 @ 17:18)  HR: 103 (08-27-18 @ 10:40)  BP: 121/64 (08-27-18 @ 10:40)  RR: 19 (08-27-18 @ 10:40)  SpO2: 91% (08-27-18 @ 10:40)  Wt(kg): --    08-26 @ 07:01  -  08-27 @ 07:00  --------------------------------------------------------  IN: 0 mL / OUT: 600 mL / NET: -600 mL          PHYSICAL EXAM:  Constitutional: NAD  Neck: No JVD  Respiratory: CTAB, no wheezes, rales or rhonchi  Cardiovascular: S1, S2, RRR  Gastrointestinal: BS+, soft, NT/ND  Extremities: No peripheral edema    Hospital Medications:   MEDICATIONS  (STANDING):  cefTRIAXone   IVPB 1 Gram(s) IV Intermittent every 24 hours  docusate sodium 100 milliGRAM(s) Oral three times a day  hydrALAZINE 25 milliGRAM(s) Oral every 8 hours  lidocaine   Patch 1 Patch Transdermal daily  pantoprazole    Tablet 40 milliGRAM(s) Oral before breakfast  sodium chloride 0.45%. 1000 milliLiter(s) (50 mL/Hr) IV Continuous <Continuous>      LABS:  08-27    135  |  99  |  43<H>  ----------------------------<  108<H>  3.8   |  24  |  1.76<H>    Ca    9.0      27 Aug 2018 05:53  Phos  3.1     08-27  Mg     2.0     08-27      Creatinine Trend: 1.76 <--, 1.67 <--, 1.50 <--, 1.47 <--, 1.56 <--    Phosphorus Level, Serum: 3.1 mg/dL (08-27 @ 05:53)                              11.5   11.56 )-----------( 177      ( 27 Aug 2018 05:53 )             35.6     Urine Studies:  Urinalysis - [08-23-18 @ 01:42]      Color RED / Appearance HAZY / SG 1.018 / pH 6.5      Gluc NEGATIVE / Ketone TRACE  / Bili NEGATIVE / Urobili NORMAL       Blood LARGE / Protein 300 / Leuk Est SMALL / Nitrite POSITIVE      RBC >50 / WBC 20-30 / Hyaline  / Gran  / Sq Epi  / Non Sq Epi FEW / Bacteria LARGE    Urine Creatinine 35.00      [08-23-18 @ 18:00]  Urine Urea Nitrogen 428.6      [08-23-18 @ 18:00]    PTH 15.53 (Ca --)      [08-24-18 @ 06:30]   --        RADIOLOGY & ADDITIONAL STUDIES:

## 2018-08-27 NOTE — PROGRESS NOTE ADULT - SUBJECTIVE AND OBJECTIVE BOX
NAVID VAUGHN 86y [id number]    Patient is a 86y old  Male who presents with a chief complaint of hematuria (25 Aug 2018 17:09)      Follow Up/CC:  ID following for hematuria    Interval History/ROS: no acute issues    Allergies    No Known Allergies    Intolerances        ANTIMICROBIALS:  cefTRIAXone   IVPB 1 every 24 hours      MEDICATIONS  (STANDING):  cefTRIAXone   IVPB 1 Gram(s) IV Intermittent every 24 hours  docusate sodium 100 milliGRAM(s) Oral three times a day  hydrALAZINE 25 milliGRAM(s) Oral every 8 hours  lidocaine   Patch 1 Patch Transdermal daily  pantoprazole    Tablet 40 milliGRAM(s) Oral before breakfast  sodium chloride 0.45%. 1000 milliLiter(s) (50 mL/Hr) IV Continuous <Continuous>    MEDICATIONS  (PRN):  melatonin 3 milliGRAM(s) Oral at bedtime PRN Insomnia  polyethylene glycol 3350 17 Gram(s) Oral at bedtime PRN Constipation        Vital Signs Last 24 Hrs  T(C): 36.9 (27 Aug 2018 13:32), Max: 37.1 (26 Aug 2018 17:18)  T(F): 98.4 (27 Aug 2018 13:32), Max: 98.8 (26 Aug 2018 17:18)  HR: 109 (27 Aug 2018 13:32) (97 - 109)  BP: 126/63 (27 Aug 2018 13:32) (107/54 - 129/66)  BP(mean): --  RR: 18 (27 Aug 2018 13:32) (17 - 19)  SpO2: 95% (27 Aug 2018 13:32) (91% - 95%)    CBC Full  -  ( 27 Aug 2018 05:53 )  WBC Count : 11.56 K/uL  Hemoglobin : 11.5 g/dL  Hematocrit : 35.6 %  Platelet Count - Automated : 177 K/uL  Mean Cell Volume : 82.6 fL  Mean Cell Hemoglobin : 26.7 pg  Mean Cell Hemoglobin Concentration : 32.3 %  Auto Neutrophil # : 8.44 K/uL  Auto Lymphocyte # : 1.03 K/uL  Auto Monocyte # : 1.63 K/uL  Auto Eosinophil # : 0.18 K/uL  Auto Basophil # : 0.04 K/uL  Auto Neutrophil % : 73.0 %  Auto Lymphocyte % : 8.9 %  Auto Monocyte % : 14.1 %  Auto Eosinophil % : 1.6 %  Auto Basophil % : 0.3 %    08-27    135  |  99  |  43<H>  ----------------------------<  108<H>  3.8   |  24  |  1.76<H>    Ca    9.0      27 Aug 2018 05:53  Phos  3.1     08-27  Mg     2.0     08-27            MICROBIOLOGY:  URINE CATHETER  08-23-18 --  --  --      BLOOD VENOUS  08-23-18 --  --  --      RADIOLOGY    CT Abdomen and Pelvis w/wo IV Cont (08.24.18 @ 18:16) >    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     Questionable soft tissue filling defect in the distal right ureter.    Retroperitoneal lymphadenopathy and small volume abdominal and pelvic   free fluid.    Lytic bone lesions concerning for metastases, including a left iliac bone   lesion with overlying cortical irregularity.    Trace right and small left pleural effusion with associated atelectasis.   Patchy groundglass and tree-in-bud opacities in the right lung which may   be infectious or associated with aspiration given the distended   fluid-filled esophagus.    Unchanged splenomegaly.    US Kidney and Bladder (08.24.18 @ 16:41) >  Abnormal left kidney with suspected soft tissue in the collecting system.   No discrete hydronephrosis.     No hydronephrosis of the right kidney

## 2018-08-27 NOTE — SWALLOW BEDSIDE ASSESSMENT ADULT - SWALLOW EVAL: RECOMMENDED FEEDING/EATING TECHNIQUES
allow for swallow between intakes/position upright (90 degrees)/crush medication (when feasible)/no straws/small sips/bites/maintain upright posture during/after eating for 30 mins

## 2018-08-27 NOTE — PROVIDER CONTACT NOTE (OTHER) - BACKGROUND
Pt has hx of bladder cancer, has tumor
Pt with hx colon cancer, and dx renal mass, UTI and hematuria.
Pt with dx bladder tumor, hematuria, UTI, basal cell, colon CA.

## 2018-08-28 LAB
BACTERIA BLD CULT: SIGNIFICANT CHANGE UP
BACTERIA BLD CULT: SIGNIFICANT CHANGE UP
BASOPHILS # BLD AUTO: 0.05 K/UL — SIGNIFICANT CHANGE UP (ref 0–0.2)
BASOPHILS NFR BLD AUTO: 0.4 % — SIGNIFICANT CHANGE UP (ref 0–2)
BUN SERPL-MCNC: 39 MG/DL — HIGH (ref 7–23)
CALCIUM SERPL-MCNC: 8.9 MG/DL — SIGNIFICANT CHANGE UP (ref 8.4–10.5)
CHLORIDE SERPL-SCNC: 96 MMOL/L — LOW (ref 98–107)
CO2 SERPL-SCNC: 26 MMOL/L — SIGNIFICANT CHANGE UP (ref 22–31)
CREAT SERPL-MCNC: 1.7 MG/DL — HIGH (ref 0.5–1.3)
EOSINOPHIL # BLD AUTO: 0.26 K/UL — SIGNIFICANT CHANGE UP (ref 0–0.5)
EOSINOPHIL NFR BLD AUTO: 2.1 % — SIGNIFICANT CHANGE UP (ref 0–6)
GLUCOSE SERPL-MCNC: 105 MG/DL — HIGH (ref 70–99)
HCT VFR BLD CALC: 35.7 % — LOW (ref 39–50)
HGB BLD-MCNC: 11.4 G/DL — LOW (ref 13–17)
IMM GRANULOCYTES # BLD AUTO: 0.2 # — SIGNIFICANT CHANGE UP
IMM GRANULOCYTES NFR BLD AUTO: 1.6 % — HIGH (ref 0–1.5)
LYMPHOCYTES # BLD AUTO: 1.1 K/UL — SIGNIFICANT CHANGE UP (ref 1–3.3)
LYMPHOCYTES # BLD AUTO: 9 % — LOW (ref 13–44)
MAGNESIUM SERPL-MCNC: 1.9 MG/DL — SIGNIFICANT CHANGE UP (ref 1.6–2.6)
MCHC RBC-ENTMCNC: 26 PG — LOW (ref 27–34)
MCHC RBC-ENTMCNC: 31.9 % — LOW (ref 32–36)
MCV RBC AUTO: 81.3 FL — SIGNIFICANT CHANGE UP (ref 80–100)
MONOCYTES # BLD AUTO: 1.84 K/UL — HIGH (ref 0–0.9)
MONOCYTES NFR BLD AUTO: 15 % — HIGH (ref 2–14)
NEUTROPHILS # BLD AUTO: 8.83 K/UL — HIGH (ref 1.8–7.4)
NEUTROPHILS NFR BLD AUTO: 71.9 % — SIGNIFICANT CHANGE UP (ref 43–77)
NRBC # FLD: 0 — SIGNIFICANT CHANGE UP
PHOSPHATE SERPL-MCNC: 3.1 MG/DL — SIGNIFICANT CHANGE UP (ref 2.5–4.5)
PLATELET # BLD AUTO: 152 K/UL — SIGNIFICANT CHANGE UP (ref 150–400)
PMV BLD: 11.3 FL — SIGNIFICANT CHANGE UP (ref 7–13)
POTASSIUM SERPL-MCNC: 3.6 MMOL/L — SIGNIFICANT CHANGE UP (ref 3.5–5.3)
POTASSIUM SERPL-SCNC: 3.6 MMOL/L — SIGNIFICANT CHANGE UP (ref 3.5–5.3)
RBC # BLD: 4.39 M/UL — SIGNIFICANT CHANGE UP (ref 4.2–5.8)
RBC # FLD: 19.8 % — HIGH (ref 10.3–14.5)
SODIUM SERPL-SCNC: 136 MMOL/L — SIGNIFICANT CHANGE UP (ref 135–145)
WBC # BLD: 12.28 K/UL — HIGH (ref 3.8–10.5)
WBC # FLD AUTO: 12.28 K/UL — HIGH (ref 3.8–10.5)

## 2018-08-28 PROCEDURE — 99232 SBSQ HOSP IP/OBS MODERATE 35: CPT

## 2018-08-28 RX ADMIN — CEFTRIAXONE 100 GRAM(S): 500 INJECTION, POWDER, FOR SOLUTION INTRAMUSCULAR; INTRAVENOUS at 01:00

## 2018-08-28 RX ADMIN — PANTOPRAZOLE SODIUM 40 MILLIGRAM(S): 20 TABLET, DELAYED RELEASE ORAL at 05:33

## 2018-08-28 RX ADMIN — Medication 25 MILLIGRAM(S): at 11:05

## 2018-08-28 RX ADMIN — Medication 25 MILLIGRAM(S): at 02:00

## 2018-08-28 RX ADMIN — Medication 100 MILLIGRAM(S): at 05:33

## 2018-08-28 RX ADMIN — Medication 100 MILLIGRAM(S): at 18:02

## 2018-08-28 RX ADMIN — Medication 25 MILLIGRAM(S): at 18:02

## 2018-08-28 RX ADMIN — LIDOCAINE 1 PATCH: 4 CREAM TOPICAL at 01:32

## 2018-08-28 NOTE — PROGRESS NOTE ADULT - SUBJECTIVE AND OBJECTIVE BOX
Patient seen and examined.  Feeling well this AM.    T(F): 97.9, Max: 98.7 (08-27-18 @ 10:40)  HR: 103  BP: 103/51  SpO2: 93%    OUT:    Indwelling Catheter - Urethral: 550 mL  Total OUT: 550 mL    Gen NAD, pleasant  Abd Soft, NT   Tena in place draining clear yellow urine    08-27 @ 05:53  WBC 11.56 / Hct 35.6  / SCr 1.76     08-26 @ 07:00  WBC 13.72 / Hct 35.5  / SCr 1.67

## 2018-08-28 NOTE — PROGRESS NOTE ADULT - ASSESSMENT
86yoM with hx bladder CA s/p TURBT Oct 2017, with gross hematuria, resolved.  Found to have recurrent bladder CA, likely mets to LN, bone    -As per notes, patient is a direct hospice candidate  -Follow up goals of care  -Outpatient follow up with Dr. Man for symptom management  -[facility] for Urology [phone]  -Will sign off, please call if questions

## 2018-08-28 NOTE — PROGRESS NOTE ADULT - SUBJECTIVE AND OBJECTIVE BOX
chief complaint : hematuria        SUBJECTIVE / OVERNIGHT EVENTS: pt denies chest pain, shortness of breath, nausea,  v    MEDICATIONS  (STANDING):  docusate sodium 100 milliGRAM(s) Oral three times a day  hydrALAZINE 25 milliGRAM(s) Oral every 8 hours  lidocaine   Patch 1 Patch Transdermal daily  pantoprazole    Tablet 40 milliGRAM(s) Oral before breakfast  sodium chloride 0.45%. 1000 milliLiter(s) (50 mL/Hr) IV Continuous <Continuous>    MEDICATIONS  (PRN):  melatonin 3 milliGRAM(s) Oral at bedtime PRN Insomnia  polyethylene glycol 3350 17 Gram(s) Oral at bedtime PRN Constipation    Vital Signs Last 24 Hrs  T(C): 36.6 (28 Aug 2018 21:01), Max: 37.1 (28 Aug 2018 14:43)  T(F): 97.8 (28 Aug 2018 21:01), Max: 98.8 (28 Aug 2018 14:43)  HR: 60 (28 Aug 2018 21:01) (60 - 113)  BP: 116/61 (28 Aug 2018 21:01) (103/51 - 122/67)  BP(mean): --  RR: 16 (28 Aug 2018 21:01) (16 - 18)  SpO2: 96% (28 Aug 2018 21:01) (93% - 96%)    Constitutional: No fever, fatigue  Skin: No rash.  Eyes: No recent vision problems or eye pain.  ENT: No congestion, ear pain, or sore throat.  Cardiovascular: No chest pain or palpation.  Respiratory: No cough, shortness of breath, congestion, or wheezing.  Gastrointestinal: No abdominal pain, nausea, vomiting, or diarrhea.  Genitourinary: No dysuria.  Musculoskeletal: No joint swelling.  Neurologic: No headache.    PHYSICAL EXAM:  GENERAL: NAD  EYES: EOMI, PERRLA  NECK: Supple, No JVD  CHEST/LUNG: dec breath sounds at bases   HEART:  S1 , S2 +  ABDOMEN: sof,t bs+  EXTREMITIES:  trace edema+  NEUROLOGY:alert awake     zimmerman = blood tinged urine +    LABS:      136  |  96<L>  |  39<H>  ----------------------------<  105<H>  3.6   |  26  |  1.70<H>    Ca    8.9      28 Aug 2018 06:40  Phos  3.1       Mg     1.9           Creatinine Trend: 1.70 <--, 1.76 <--, 1.67 <--, 1.50 <--, 1.47 <--, 1.56 <--                        11.4   12. )-----------( 152      ( 28 Aug 2018 06:40 )             35.7     Urine Studies:  Urinalysis Basic - ( 23 Aug 2018 01:42 )    Color: RED / Appearance: HAZY / S.018 / pH: 6.5  Gluc: NEGATIVE / Ketone: TRACE  / Bili: NEGATIVE / Urobili: NORMAL   Blood: LARGE / Protein: 300 / Nitrite: POSITIVE   Leuk Esterase: SMALL / RBC: >50 / WBC 20-30   Sq Epi:  / Non Sq Epi: FEW / Bacteria: LARGE      Creatinine, Random Urine: 35.00 mg/dL ( @ 18:00)

## 2018-08-28 NOTE — PROGRESS NOTE ADULT - SUBJECTIVE AND OBJECTIVE BOX
[facility]   MD BOBBY FREGOSO MD ANGELA WONG, PA    TEL:  OFFICE: [phone]  DR VELASQUEZ CELL: [phone]  DR. GARCIA CELL: [phone]  SOCRATES BECERRA CELL: [phone]        Patient is a 86y old  Male who presents with a chief complaint of hematuria (25 Aug 2018 17:09)      Patient seen and examined at bedside. No chest pain/sob    VITALS:  T(F): 97.9 (08-28-18 @ 05:31), Max: 98.7 (08-27-18 @ 10:40)  HR: 103 (08-28-18 @ 05:31)  BP: 103/51 (08-28-18 @ 05:31)  RR: 18 (08-28-18 @ 05:31)  SpO2: 93% (08-28-18 @ 05:31)  Wt(kg): --    08-27 @ 07:01  -  08-28 @ 07:00  --------------------------------------------------------  IN: 936 mL / OUT: 2150 mL / NET: -1214 mL          PHYSICAL EXAM:  Constitutional: NAD  Neck: No JVD  Respiratory: CTAB, no wheezes, rales or rhonchi  Cardiovascular: S1, S2, RRR  Gastrointestinal: BS+, soft, NT/ND  Extremities: trace peripheral edema    Hospital Medications:   MEDICATIONS  (STANDING):  cefTRIAXone   IVPB 1 Gram(s) IV Intermittent every 24 hours  docusate sodium 100 milliGRAM(s) Oral three times a day  hydrALAZINE 25 milliGRAM(s) Oral every 8 hours  lidocaine   Patch 1 Patch Transdermal daily  pantoprazole    Tablet 40 milliGRAM(s) Oral before breakfast  sodium chloride 0.45%. 1000 milliLiter(s) (50 mL/Hr) IV Continuous <Continuous>      LABS:  08-28    136  |  96<L>  |  39<H>  ----------------------------<  105<H>  3.6   |  26  |  1.70<H>    Ca    8.9      28 Aug 2018 06:40  Phos  3.1     08-28  Mg     1.9     08-28      Creatinine Trend: 1.70 <--, 1.76 <--, 1.67 <--, 1.50 <--, 1.47 <--, 1.56 <--    Phosphorus Level, Serum: 3.1 mg/dL (08-28 @ 06:40)                              11.4   12.28 )-----------( 152      ( 28 Aug 2018 06:40 )             35.7     Urine Studies:  Urinalysis - [08-23-18 @ 01:42]      Color RED / Appearance HAZY / SG 1.018 / pH 6.5      Gluc NEGATIVE / Ketone TRACE  / Bili NEGATIVE / Urobili NORMAL       Blood LARGE / Protein 300 / Leuk Est SMALL / Nitrite POSITIVE      RBC >50 / WBC 20-30 / Hyaline  / Gran  / Sq Epi  / Non Sq Epi FEW / Bacteria LARGE    Urine Creatinine 35.00      [08-23-18 @ 18:00]  Urine Urea Nitrogen 428.6      [08-23-18 @ 18:00]    PTH 15.53 (Ca --)      [08-24-18 @ 06:30]   --        RADIOLOGY & ADDITIONAL STUDIES: [facility]   MD BOBBY FREGOSO MD ANGELA WONG, PA    TEL:  OFFICE: [phone]  DR VELASQUEZ CELL: [phone]  DR. GARCIA CELL: [phone]  SOCRATES BECERRA CELL: [phone]        Patient is a 86y old  Male who presents with a chief complaint of hematuria       Patient seen and examined at bedside. No chest pain/sob    VITALS:  T(F): 97.9 (08-28-18 @ 05:31), Max: 98.7 (08-27-18 @ 10:40)  HR: 103 (08-28-18 @ 05:31)  BP: 103/51 (08-28-18 @ 05:31)  RR: 18 (08-28-18 @ 05:31)  SpO2: 93% (08-28-18 @ 05:31)  Wt(kg): --    08-27 @ 07:01  -  08-28 @ 07:00  --------------------------------------------------------  IN: 936 mL / OUT: 2150 mL / NET: -1214 mL          PHYSICAL EXAM:  Constitutional: NAD  Neck: No JVD  Respiratory: CTAB, no wheezes, rales or rhonchi  Cardiovascular: S1, S2, RRR  Gastrointestinal: BS+, soft, NT/ND  Extremities: trace peripheral edema    Hospital Medications:   MEDICATIONS  (STANDING):  cefTRIAXone   IVPB 1 Gram(s) IV Intermittent every 24 hours  docusate sodium 100 milliGRAM(s) Oral three times a day  hydrALAZINE 25 milliGRAM(s) Oral every 8 hours  lidocaine   Patch 1 Patch Transdermal daily  pantoprazole    Tablet 40 milliGRAM(s) Oral before breakfast  sodium chloride 0.45%. 1000 milliLiter(s) (50 mL/Hr) IV Continuous <Continuous>      LABS:  08-28    136  |  96<L>  |  39<H>  ----------------------------<  105<H>  3.6   |  26  |  1.70<H>    Ca    8.9      28 Aug 2018 06:40  Phos  3.1     08-28  Mg     1.9     08-28      Creatinine Trend: 1.70 <--, 1.76 <--, 1.67 <--, 1.50 <--, 1.47 <--, 1.56 <--    Phosphorus Level, Serum: 3.1 mg/dL (08-28 @ 06:40)                              11.4   12.28 )-----------( 152      ( 28 Aug 2018 06:40 )             35.7     Urine Studies:  Urinalysis - [08-23-18 @ 01:42]      Color RED / Appearance HAZY / SG 1.018 / pH 6.5      Gluc NEGATIVE / Ketone TRACE  / Bili NEGATIVE / Urobili NORMAL       Blood LARGE / Protein 300 / Leuk Est SMALL / Nitrite POSITIVE      RBC >50 / WBC 20-30 / Hyaline  / Gran  / Sq Epi  / Non Sq Epi FEW / Bacteria LARGE    Urine Creatinine 35.00      [08-23-18 @ 18:00]  Urine Urea Nitrogen 428.6      [08-23-18 @ 18:00]    PTH 15.53 (Ca --)      [08-24-18 @ 06:30]   --        RADIOLOGY & ADDITIONAL STUDIES:

## 2018-08-28 NOTE — PROGRESS NOTE ADULT - SUBJECTIVE AND OBJECTIVE BOX
NAVID VAUGHN 86y [id number]    Patient is a 86y old  Male who presents with a chief complaint of hematuria (25 Aug 2018 17:09)      Follow Up/CC:  ID following for fever    Interval History/ROS: feels ok    Allergies    No Known Allergies    Intolerances        ANTIMICROBIALS:  cefTRIAXone   IVPB 1 every 24 hours      MEDICATIONS  (STANDING):  cefTRIAXone   IVPB 1 Gram(s) IV Intermittent every 24 hours  docusate sodium 100 milliGRAM(s) Oral three times a day  hydrALAZINE 25 milliGRAM(s) Oral every 8 hours  lidocaine   Patch 1 Patch Transdermal daily  pantoprazole    Tablet 40 milliGRAM(s) Oral before breakfast  sodium chloride 0.45%. 1000 milliLiter(s) (50 mL/Hr) IV Continuous <Continuous>    MEDICATIONS  (PRN):  melatonin 3 milliGRAM(s) Oral at bedtime PRN Insomnia  polyethylene glycol 3350 17 Gram(s) Oral at bedtime PRN Constipation        Vital Signs Last 24 Hrs  T(C): 36.6 (28 Aug 2018 05:31), Max: 36.8 (27 Aug 2018 21:35)  T(F): 97.9 (28 Aug 2018 05:31), Max: 98.2 (27 Aug 2018 21:35)  HR: 99 (28 Aug 2018 10:57) (99 - 107)  BP: 119/57 (28 Aug 2018 10:57) (103/51 - 141/81)  BP(mean): --  RR: 18 (28 Aug 2018 10:57) (18 - 19)  SpO2: 95% (28 Aug 2018 10:57) (93% - 98%)    CBC Full  -  ( 28 Aug 2018 06:40 )  WBC Count : 12.28 K/uL  Hemoglobin : 11.4 g/dL  Hematocrit : 35.7 %  Platelet Count - Automated : 152 K/uL  Mean Cell Volume : 81.3 fL  Mean Cell Hemoglobin : 26.0 pg  Mean Cell Hemoglobin Concentration : 31.9 %  Auto Neutrophil # : 8.83 K/uL  Auto Lymphocyte # : 1.10 K/uL  Auto Monocyte # : 1.84 K/uL  Auto Eosinophil # : 0.26 K/uL  Auto Basophil # : 0.05 K/uL  Auto Neutrophil % : 71.9 %  Auto Lymphocyte % : 9.0 %  Auto Monocyte % : 15.0 %  Auto Eosinophil % : 2.1 %  Auto Basophil % : 0.4 %    08-28    136  |  96<L>  |  39<H>  ----------------------------<  105<H>  3.6   |  26  |  1.70<H>    Ca    8.9      28 Aug 2018 06:40  Phos  3.1     08-28  Mg     1.9     08-28            MICROBIOLOGY:  URINE CATHETER  08-23-18 --  --  --      BLOOD VENOUS  08-23-18 --  --  --              v            RADIOLOGY

## 2018-08-28 NOTE — PROGRESS NOTE ADULT - PROBLEM SELECTOR PLAN 3
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

## 2018-08-28 NOTE — PROGRESS NOTE ADULT - PROBLEM SELECTOR PLAN 1
MARYELLEN on CKD  scr. ~1.3-1.4 in 2017.   Pt with hx of long stand HTN   renal function better today, worsen before likely sec to contrast study done 8/24  s/p zimmerman in place  on spironolactone at home, hold for now   continue to monitor bmp  phos level is ok  PTH is low MARYELLEN on CKD  scr. ~1.3-1.4 in 2017.   Pt with hx of long stand HTN   renal function stable,  worsen before likely sec to contrast study done 8/24  s/p zimmerman in place  on spironolactone at home, hold for now   continue to monitor bmp  phos level is ok  PTH is low

## 2018-08-29 PROCEDURE — 99232 SBSQ HOSP IP/OBS MODERATE 35: CPT

## 2018-08-29 RX ORDER — DOCUSATE SODIUM 100 MG
100 CAPSULE ORAL THREE TIMES A DAY
Qty: 0 | Refills: 0 | Status: DISCONTINUED | OUTPATIENT
Start: 2018-08-29 | End: 2018-08-30

## 2018-08-29 RX ORDER — PANTOPRAZOLE SODIUM 20 MG/1
1 TABLET, DELAYED RELEASE ORAL
Qty: 0 | Refills: 0 | COMMUNITY
Start: 2018-08-29

## 2018-08-29 RX ORDER — ACETAMINOPHEN 500 MG
650 TABLET ORAL ONCE
Qty: 0 | Refills: 0 | Status: COMPLETED | OUTPATIENT
Start: 2018-08-29 | End: 2018-08-29

## 2018-08-29 RX ORDER — SPIRONOLACTONE 25 MG/1
25 TABLET, FILM COATED ORAL DAILY
Qty: 0 | Refills: 0 | Status: DISCONTINUED | OUTPATIENT
Start: 2018-08-29 | End: 2018-08-30

## 2018-08-29 RX ORDER — HYDROXYZINE HCL 10 MG
25 TABLET ORAL AT BEDTIME
Qty: 0 | Refills: 0 | Status: DISCONTINUED | OUTPATIENT
Start: 2018-08-29 | End: 2018-08-30

## 2018-08-29 RX ORDER — SENNA PLUS 8.6 MG/1
1 TABLET ORAL AT BEDTIME
Qty: 0 | Refills: 0 | Status: DISCONTINUED | OUTPATIENT
Start: 2018-08-29 | End: 2018-08-30

## 2018-08-29 RX ORDER — PANTOPRAZOLE SODIUM 20 MG/1
40 TABLET, DELAYED RELEASE ORAL
Qty: 0 | Refills: 0 | Status: DISCONTINUED | OUTPATIENT
Start: 2018-08-29 | End: 2018-08-30

## 2018-08-29 RX ADMIN — Medication 3 MILLIGRAM(S): at 23:08

## 2018-08-29 RX ADMIN — Medication 650 MILLIGRAM(S): at 14:10

## 2018-08-29 RX ADMIN — Medication 100 MILLIGRAM(S): at 05:13

## 2018-08-29 RX ADMIN — Medication 25 MILLIGRAM(S): at 10:29

## 2018-08-29 RX ADMIN — Medication 100 MILLIGRAM(S): at 13:31

## 2018-08-29 RX ADMIN — PANTOPRAZOLE SODIUM 40 MILLIGRAM(S): 20 TABLET, DELAYED RELEASE ORAL at 05:13

## 2018-08-29 RX ADMIN — Medication 650 MILLIGRAM(S): at 13:31

## 2018-08-29 RX ADMIN — SENNA PLUS 1 TABLET(S): 8.6 TABLET ORAL at 23:08

## 2018-08-29 RX ADMIN — Medication 25 MILLIGRAM(S): at 18:47

## 2018-08-29 RX ADMIN — Medication 25 MILLIGRAM(S): at 23:08

## 2018-08-29 RX ADMIN — SPIRONOLACTONE 25 MILLIGRAM(S): 25 TABLET, FILM COATED ORAL at 18:47

## 2018-08-29 RX ADMIN — Medication 100 MILLIGRAM(S): at 23:08

## 2018-08-29 RX ADMIN — Medication 25 MILLIGRAM(S): at 02:53

## 2018-08-29 NOTE — PROGRESS NOTE ADULT - PROBLEM SELECTOR PLAN 6
unclear cardiac status  neg DVT  chest xray showed clear lungs
unclear cardiac status  pending doppler to r/o DVT  chest xray showed clear lungs

## 2018-08-29 NOTE — PROGRESS NOTE ADULT - SUBJECTIVE AND OBJECTIVE BOX
NAVID VAUGHN 86y [id number]    Patient is a 86y old  Male who presents with a chief complaint of hematuria (25 Aug 2018 17:09)      Follow Up/CC:  ID following for fever    Interval History/ROS: no fever    Allergies    No Known Allergies    Intolerances        ANTIMICROBIALS:      MEDICATIONS  (STANDING):  docusate sodium 100 milliGRAM(s) Oral three times a day  hydrALAZINE 25 milliGRAM(s) Oral every 8 hours  lidocaine   Patch 1 Patch Transdermal daily  pantoprazole    Tablet 40 milliGRAM(s) Oral before breakfast  sodium chloride 0.45%. 1000 milliLiter(s) (50 mL/Hr) IV Continuous <Continuous>    MEDICATIONS  (PRN):  melatonin 3 milliGRAM(s) Oral at bedtime PRN Insomnia  polyethylene glycol 3350 17 Gram(s) Oral at bedtime PRN Constipation        Vital Signs Last 24 Hrs  T(C): 37 (29 Aug 2018 14:40), Max: 37 (29 Aug 2018 14:40)  T(F): 98.6 (29 Aug 2018 14:40), Max: 98.6 (29 Aug 2018 14:40)  HR: 109 (29 Aug 2018 14:40) (60 - 113)  BP: 108/70 (29 Aug 2018 14:40) (107/59 - 127/60)  BP(mean): --  RR: 18 (29 Aug 2018 14:40) (16 - 18)  SpO2: 93% (29 Aug 2018 14:40) (93% - 96%)    CBC Full  -  ( 28 Aug 2018 06:40 )  WBC Count : 12.28 K/uL  Hemoglobin : 11.4 g/dL  Hematocrit : 35.7 %  Platelet Count - Automated : 152 K/uL  Mean Cell Volume : 81.3 fL  Mean Cell Hemoglobin : 26.0 pg  Mean Cell Hemoglobin Concentration : 31.9 %  Auto Neutrophil # : 8.83 K/uL  Auto Lymphocyte # : 1.10 K/uL  Auto Monocyte # : 1.84 K/uL  Auto Eosinophil # : 0.26 K/uL  Auto Basophil # : 0.05 K/uL  Auto Neutrophil % : 71.9 %  Auto Lymphocyte % : 9.0 %  Auto Monocyte % : 15.0 %  Auto Eosinophil % : 2.1 %  Auto Basophil % : 0.4 %    08-28    136  |  96<L>  |  39<H>  ----------------------------<  105<H>  3.6   |  26  |  1.70<H>    Ca    8.9      28 Aug 2018 06:40  Phos  3.1     08-28  Mg     1.9     08-28            MICROBIOLOGY:  URINE CATHETER  08-23-18 --  --  --      BLOOD VENOUS  08-23-18 --  --  --              v            RADIOLOGY

## 2018-08-29 NOTE — PROGRESS NOTE ADULT - SUBJECTIVE AND OBJECTIVE BOX
chief complaint : hematuria        SUBJECTIVE / OVERNIGHT EVENTS: pt denies chest pain, shortness of breath, nausea,  v    MEDICATIONS  (STANDING):  docusate sodium 100 milliGRAM(s) Oral three times a day  docusate sodium 100 milliGRAM(s) Oral three times a day  hydrALAZINE 25 milliGRAM(s) Oral every 8 hours  hydrOXYzine hydrochloride 25 milliGRAM(s) Oral at bedtime  lidocaine   Patch 1 Patch Transdermal daily  pantoprazole    Tablet 40 milliGRAM(s) Oral before breakfast  senna 1 Tablet(s) Oral at bedtime  sodium chloride 0.45%. 1000 milliLiter(s) (50 mL/Hr) IV Continuous <Continuous>  spironolactone 25 milliGRAM(s) Oral daily    MEDICATIONS  (PRN):  melatonin 3 milliGRAM(s) Oral at bedtime PRN Insomnia  polyethylene glycol 3350 17 Gram(s) Oral at bedtime PRN Constipation    Vital Signs Last 24 Hrs  T(C): 36.7 (29 Aug 2018 21:05), Max: 37 (29 Aug 2018 14:40)  T(F): 98 (29 Aug 2018 21:05), Max: 98.6 (29 Aug 2018 14:40)  HR: 113 (29 Aug 2018 21:05) (94 - 113)  BP: 123/81 (29 Aug 2018 21:05) (105/55 - 127/60)  BP(mean): --  RR: 18 (29 Aug 2018 21:05) (17 - 18)  SpO2: 95% (29 Aug 2018 21:05) (93% - 95%)    Constitutional: No fever, fatigue  Skin: No rash.  Eyes: No recent vision problems or eye pain.  ENT: No congestion, ear pain, or sore throat.  Cardiovascular: No chest pain or palpation.  Respiratory: No cough, shortness of breath, congestion, or wheezing.  Gastrointestinal: No abdominal pain, nausea, vomiting, or diarrhea.  Genitourinary: No dysuria.  Musculoskeletal: No joint swelling.  Neurologic: No headache.    PHYSICAL EXAM:  GENERAL: NAD  EYES: EOMI, PERRLA  NECK: Supple, No JVD  CHEST/LUNG: dec breath sounds at bases   HEART:  S1 , S2 +  ABDOMEN: sof,t bs+  EXTREMITIES:  trace edema+  NEUROLOGY:alert awake     zimmerman = blood tinged urine +    LABS:      136  |  96<L>  |  39<H>  ----------------------------<  105<H>  3.6   |  26  |  1.70<H>    Ca    8.9      28 Aug 2018 06:40  Phos  3.1       Mg     1.9           Creatinine Trend: 1.70 <--, 1.76 <--, 1.67 <--, 1.50 <--, 1.47 <--, 1.56 <--                        11.4   12.28 )-----------( 152      ( 28 Aug 2018 06:40 )             35.7     Urine Studies:  Urinalysis Basic - ( 23 Aug 2018 01:42 )    Color: RED / Appearance: HAZY / S.018 / pH: 6.5  Gluc: NEGATIVE / Ketone: TRACE  / Bili: NEGATIVE / Urobili: NORMAL   Blood: LARGE / Protein: 300 / Nitrite: POSITIVE   Leuk Esterase: SMALL / RBC: >50 / WBC 20-30   Sq Epi:  / Non Sq Epi: FEW / Bacteria: LARGE      Creatinine, Random Urine: 35.00 mg/dL ( @ 18:00)

## 2018-08-29 NOTE — PROGRESS NOTE ADULT - PROBLEM SELECTOR PROBLEM 5
Leukocytosis
Proteinuria
Leukocytosis
Proteinuria
Leukocytosis

## 2018-08-29 NOTE — PROGRESS NOTE ADULT - NEGATIVE ENMT SYMPTOMS
no ear pain/no throat pain/no nose bleeds
no ear pain/no nose bleeds/no throat pain
no ear pain/no throat pain/no nose bleeds
no nose bleeds/no throat pain/no ear pain

## 2018-08-29 NOTE — PROGRESS NOTE ADULT - NEGATIVE GASTROINTESTINAL SYMPTOMS
no abdominal pain/no diarrhea/no nausea/no vomiting
no abdominal pain/no diarrhea/no vomiting/no nausea
no diarrhea/no nausea/no vomiting/no abdominal pain
no abdominal pain/no diarrhea/no nausea/no vomiting

## 2018-08-29 NOTE — PROGRESS NOTE ADULT - PROBLEM SELECTOR PLAN 4
likely obstructive pathology   monitor renal fx  closely   avoid nephrotoxic agents
likely obstructive pathology   monitor renal fx  closely   avoid nephrotoxic agents
-better
controlled  on hydralazine 25q8  monitor.
-better
controlled  on hydralazine 25q8  monitor.
likely obstructive pathology   monitor renal fx  closely   avoid nephrotoxic agents
-better

## 2018-08-29 NOTE — PROGRESS NOTE ADULT - GASTROINTESTINAL DETAILS
no guarding/no distention/no rebound tenderness/no rigidity/soft/nontender
nontender/no rebound tenderness/soft/no distention/no guarding/no rigidity
no distention/nontender/no guarding/no rebound tenderness/no rigidity/soft
soft/no guarding/nontender/no distention/no rebound tenderness/no rigidity

## 2018-08-29 NOTE — PROGRESS NOTE ADULT - PROBLEM SELECTOR PROBLEM 4
Renal insufficiency
Renal insufficiency
Fever
Hypertension
Fever
Hypertension
Renal insufficiency
Fever

## 2018-08-29 NOTE — PROGRESS NOTE ADULT - NEGATIVE OPHTHALMOLOGIC SYMPTOMS
no blurred vision L/no blurred vision R
no blurred vision R/no blurred vision L
no blurred vision L/no blurred vision R
no blurred vision L/no blurred vision R

## 2018-08-29 NOTE — PROGRESS NOTE ADULT - PROBLEM SELECTOR PROBLEM 3
Hypertension
Hypertension
Hematuria
UTI (urinary tract infection)
Hematuria
Hypertension
UTI (urinary tract infection)

## 2018-08-29 NOTE — PROGRESS NOTE ADULT - PROBLEM SELECTOR PLAN 5
-? reactive to  mass seen on CZT with ? of mets.   -Urology eval in progress.
s/p zimmerman  +UTI  check urine p/c ratio.
-cont to trend
s/p zimmerman  +UTI
s/p zimmerman  +UTI
s/p zimmerman  +UTI  check urine p/c ratio.
-cont to trend

## 2018-08-29 NOTE — PROGRESS NOTE ADULT - PROBLEM SELECTOR PROBLEM 1
Hematuria
Hematuria
Renal insufficiency
Sepsis due to urinary tract infection
Hematuria
Renal insufficiency
Sepsis due to urinary tract infection

## 2018-08-29 NOTE — PROGRESS NOTE ADULT - PROBLEM SELECTOR PLAN 2
cont present abx
cont present abx
- input noted  -refusing irrigation
UA consistent with UTI, pending cx.
- input noted  -likely from cancer
UA consistent with UTI, pending cx.
Urine cx is neg
Urine cx is neg
cont present abx
- input noted  -likely from cancer
- input noted  -refusing irrigation
- input noted  -likely from cancer

## 2018-08-29 NOTE — PROGRESS NOTE ADULT - SUBJECTIVE AND OBJECTIVE BOX
[facility]   MD BOBBY FREGOSO MD ANGELA WONG, PA    TEL:  OFFICE: [phone]  DR VELASQUEZ CELL: [phone]  DR. GARCIA CELL: [phone]  SOCRATES BECERRA CELL: [phone]        Patient is a 86y old  Male who presents with a chief complaint of hematuria (25 Aug 2018 17:09)      Patient seen and examined at bedside. No chest pain/sob    VITALS:  T(F): 98.4 (08-29-18 @ 05:09), Max: 98.8 (08-28-18 @ 14:43)  HR: 94 (08-29-18 @ 05:09)  BP: 127/60 (08-29-18 @ 05:09)  RR: 17 (08-29-18 @ 05:09)  SpO2: 94% (08-29-18 @ 05:09)  Wt(kg): --    08-28 @ 07:01  -  08-29 @ 07:00  --------------------------------------------------------  IN: 100 mL / OUT: 500 mL / NET: -400 mL          PHYSICAL EXAM:  Constitutional: NAD  Neck: No JVD  Respiratory: CTAB, no wheezes, rales or rhonchi  Cardiovascular: S1, S2, RRR  Gastrointestinal: BS+, soft, NT/ND  Extremities: trace peripheral edema    Hospital Medications:   MEDICATIONS  (STANDING):  docusate sodium 100 milliGRAM(s) Oral three times a day  hydrALAZINE 25 milliGRAM(s) Oral every 8 hours  lidocaine   Patch 1 Patch Transdermal daily  pantoprazole    Tablet 40 milliGRAM(s) Oral before breakfast  sodium chloride 0.45%. 1000 milliLiter(s) (50 mL/Hr) IV Continuous <Continuous>      LABS:  08-28    136  |  96<L>  |  39<H>  ----------------------------<  105<H>  3.6   |  26  |  1.70<H>    Ca    8.9      28 Aug 2018 06:40  Phos  3.1     08-28  Mg     1.9     08-28      Creatinine Trend: 1.70 <--, 1.76 <--, 1.67 <--, 1.50 <--, 1.47 <--, 1.56 <--                                11.4   12.28 )-----------( 152      ( 28 Aug 2018 06:40 )             35.7     Urine Studies:  Urinalysis - [08-23-18 @ 01:42]      Color RED / Appearance HAZY / SG 1.018 / pH 6.5      Gluc NEGATIVE / Ketone TRACE  / Bili NEGATIVE / Urobili NORMAL       Blood LARGE / Protein 300 / Leuk Est SMALL / Nitrite POSITIVE      RBC >50 / WBC 20-30 / Hyaline  / Gran  / Sq Epi  / Non Sq Epi FEW / Bacteria LARGE    Urine Creatinine 35.00      [08-23-18 @ 18:00]  Urine Urea Nitrogen 428.6      [08-23-18 @ 18:00]    PTH 15.53 (Ca --)      [08-24-18 @ 06:30]   --        RADIOLOGY & ADDITIONAL STUDIES: [facility]   MD BOBBY FREGOSO MD ANGELA WONG, PA    TEL:  OFFICE: [phone]  DR VELASQUEZ CELL: [phone]  DR. GARCIA CELL: [phone]  SOCRATES BECERRA CELL: [phone]        Patient is a 86y old  Male who presents with a chief complaint of hematuria     Patient seen and examined at bedside. No chest pain/sob    VITALS:  T(F): 98.4 (08-29-18 @ 05:09), Max: 98.8 (08-28-18 @ 14:43)  HR: 94 (08-29-18 @ 05:09)  BP: 127/60 (08-29-18 @ 05:09)  RR: 17 (08-29-18 @ 05:09)  SpO2: 94% (08-29-18 @ 05:09)  Wt(kg): --    08-28 @ 07:01  -  08-29 @ 07:00  --------------------------------------------------------  IN: 100 mL / OUT: 500 mL / NET: -400 mL          PHYSICAL EXAM:  Constitutional: NAD  Neck: No JVD  Respiratory: CTAB, no wheezes, rales or rhonchi  Cardiovascular: S1, S2, RRR  Gastrointestinal: BS+, soft, NT/ND  Extremities: trace peripheral edema    Hospital Medications:   MEDICATIONS  (STANDING):  docusate sodium 100 milliGRAM(s) Oral three times a day  hydrALAZINE 25 milliGRAM(s) Oral every 8 hours  lidocaine   Patch 1 Patch Transdermal daily  pantoprazole    Tablet 40 milliGRAM(s) Oral before breakfast  sodium chloride 0.45%. 1000 milliLiter(s) (50 mL/Hr) IV Continuous <Continuous>      LABS:  08-28    136  |  96<L>  |  39<H>  ----------------------------<  105<H>  3.6   |  26  |  1.70<H>    Ca    8.9      28 Aug 2018 06:40  Phos  3.1     08-28  Mg     1.9     08-28      Creatinine Trend: 1.70 <--, 1.76 <--, 1.67 <--, 1.50 <--, 1.47 <--, 1.56 <--                                11.4   12.28 )-----------( 152      ( 28 Aug 2018 06:40 )             35.7     Urine Studies:  Urinalysis - [08-23-18 @ 01:42]      Color RED / Appearance HAZY / SG 1.018 / pH 6.5      Gluc NEGATIVE / Ketone TRACE  / Bili NEGATIVE / Urobili NORMAL       Blood LARGE / Protein 300 / Leuk Est SMALL / Nitrite POSITIVE      RBC >50 / WBC 20-30 / Hyaline  / Gran  / Sq Epi  / Non Sq Epi FEW / Bacteria LARGE    Urine Creatinine 35.00      [08-23-18 @ 18:00]  Urine Urea Nitrogen 428.6      [08-23-18 @ 18:00]    PTH 15.53 (Ca --)      [08-24-18 @ 06:30]   --        RADIOLOGY & ADDITIONAL STUDIES:

## 2018-08-29 NOTE — PROGRESS NOTE ADULT - RS GEN PE MLT RESP DETAILS PC
respirations non-labored/clear to auscultation bilaterally
clear to auscultation bilaterally/respirations non-labored
clear to auscultation bilaterally/respirations non-labored
respirations non-labored/clear to auscultation bilaterally

## 2018-08-29 NOTE — PROGRESS NOTE ADULT - NEUROLOGICAL DETAILS
responds to verbal commands/alert and oriented x 3
responds to verbal commands

## 2018-08-29 NOTE — PROGRESS NOTE ADULT - ATTENDING COMMENTS
Agree with plan
patient seen and examined, patient does not want irrigation or catheter change, he is aware of the risks. Will monitor.
Rajendra Wiseman  Attending Physician   Division of Infectious Disease  Pager #[phone]  After 5pm/weekend or no response, call #[phone]
Rajendra Wiseman  Attending Physician   Division of Infectious Disease  Pager #[phone]  After 5pm/weekend or no response, call #[phone]    Will sign off, recall ID if needed #[phone].
Rajendra Wiseman  Attending Physician   Division of Infectious Disease  Pager #[phone]  After 5pm/weekend or no response, call #[phone]    Please call the ID service [phone] with questions or concerns over the weekend.
Rajendra Wiseman  Attending Physician   Division of Infectious Disease  Pager #[phone]  After 5pm/weekend or no response, call #[phone]

## 2018-08-29 NOTE — PROGRESS NOTE ADULT - PROBLEM SELECTOR PROBLEM 2
UTI (urinary tract infection)
UTI (urinary tract infection)
Hematuria
UTI (urinary tract infection)
Hematuria
UTI (urinary tract infection)
Hematuria

## 2018-08-30 VITALS — WEIGHT: 142.2 LBS

## 2018-08-30 LAB
BASOPHILS # BLD AUTO: 0.05 K/UL — SIGNIFICANT CHANGE UP (ref 0–0.2)
BASOPHILS NFR BLD AUTO: 0.4 % — SIGNIFICANT CHANGE UP (ref 0–2)
BUN SERPL-MCNC: 49 MG/DL — HIGH (ref 7–23)
CALCIUM SERPL-MCNC: 9.3 MG/DL — SIGNIFICANT CHANGE UP (ref 8.4–10.5)
CHLORIDE SERPL-SCNC: 94 MMOL/L — LOW (ref 98–107)
CO2 SERPL-SCNC: 27 MMOL/L — SIGNIFICANT CHANGE UP (ref 22–31)
CREAT SERPL-MCNC: 1.79 MG/DL — HIGH (ref 0.5–1.3)
EOSINOPHIL # BLD AUTO: 0.19 K/UL — SIGNIFICANT CHANGE UP (ref 0–0.5)
EOSINOPHIL NFR BLD AUTO: 1.4 % — SIGNIFICANT CHANGE UP (ref 0–6)
GLUCOSE SERPL-MCNC: 96 MG/DL — SIGNIFICANT CHANGE UP (ref 70–99)
HCT VFR BLD CALC: 35 % — LOW (ref 39–50)
HGB BLD-MCNC: 11.2 G/DL — LOW (ref 13–17)
IMM GRANULOCYTES # BLD AUTO: 0.25 # — SIGNIFICANT CHANGE UP
IMM GRANULOCYTES NFR BLD AUTO: 1.9 % — HIGH (ref 0–1.5)
LYMPHOCYTES # BLD AUTO: 1.06 K/UL — SIGNIFICANT CHANGE UP (ref 1–3.3)
LYMPHOCYTES # BLD AUTO: 8 % — LOW (ref 13–44)
MAGNESIUM SERPL-MCNC: 2.2 MG/DL — SIGNIFICANT CHANGE UP (ref 1.6–2.6)
MCHC RBC-ENTMCNC: 26 PG — LOW (ref 27–34)
MCHC RBC-ENTMCNC: 32 % — SIGNIFICANT CHANGE UP (ref 32–36)
MCV RBC AUTO: 81.4 FL — SIGNIFICANT CHANGE UP (ref 80–100)
MONOCYTES # BLD AUTO: 1.83 K/UL — HIGH (ref 0–0.9)
MONOCYTES NFR BLD AUTO: 13.8 % — SIGNIFICANT CHANGE UP (ref 2–14)
NEUTROPHILS # BLD AUTO: 9.85 K/UL — HIGH (ref 1.8–7.4)
NEUTROPHILS NFR BLD AUTO: 74.5 % — SIGNIFICANT CHANGE UP (ref 43–77)
NRBC # FLD: 0 — SIGNIFICANT CHANGE UP
PHOSPHATE SERPL-MCNC: 3.4 MG/DL — SIGNIFICANT CHANGE UP (ref 2.5–4.5)
PLATELET # BLD AUTO: 159 K/UL — SIGNIFICANT CHANGE UP (ref 150–400)
PMV BLD: 11.4 FL — SIGNIFICANT CHANGE UP (ref 7–13)
POTASSIUM SERPL-MCNC: 3.7 MMOL/L — SIGNIFICANT CHANGE UP (ref 3.5–5.3)
POTASSIUM SERPL-SCNC: 3.7 MMOL/L — SIGNIFICANT CHANGE UP (ref 3.5–5.3)
RBC # BLD: 4.3 M/UL — SIGNIFICANT CHANGE UP (ref 4.2–5.8)
RBC # FLD: 19.9 % — HIGH (ref 10.3–14.5)
SODIUM SERPL-SCNC: 134 MMOL/L — LOW (ref 135–145)
WBC # BLD: 13.23 K/UL — HIGH (ref 3.8–10.5)
WBC # FLD AUTO: 13.23 K/UL — HIGH (ref 3.8–10.5)

## 2018-08-30 RX ORDER — HYDRALAZINE HCL 50 MG
1 TABLET ORAL
Qty: 0 | Refills: 0 | COMMUNITY
Start: 2018-08-30

## 2018-08-30 RX ADMIN — SPIRONOLACTONE 25 MILLIGRAM(S): 25 TABLET, FILM COATED ORAL at 05:43

## 2018-08-30 RX ADMIN — Medication 25 MILLIGRAM(S): at 02:40

## 2018-08-30 RX ADMIN — PANTOPRAZOLE SODIUM 40 MILLIGRAM(S): 20 TABLET, DELAYED RELEASE ORAL at 05:43

## 2018-08-30 RX ADMIN — Medication 25 MILLIGRAM(S): at 11:21

## 2018-08-30 NOTE — GOALS OF CARE CONVERSATION - PERSONAL ADVANCE DIRECTIVE - CONVERSATION DETAILS
[facility]    Pt evaluation for admission to hospice care was done today. Pt is eligible for home hospice care. Tel call made to pt's son Lalito Madrid (HCP) [phone] he was made aware. Hospice care, services, eligibility and consents explained. Good understanding was expressed. He will be visiting [facility] on Tues 8/28/18 and will call [facility] RN when he is here to further discuss pt's care.
[facility] - Hospice consents signed. Pt is going to rehab at [facility] in [place] for 3 days and will then be [facility]. O2 can be ordered from [facility].

## 2018-08-30 NOTE — DIETITIAN INITIAL EVALUATION ADULT. - NS AS NUTRI INTERV MEALS SNACK
Texture-modified diet/Other (specify)/Diets modified for specific foods and ingredients/1. Suggest: PO diet rx: Dysphagia 2 Mechanical Soft Nectar Consistency Fluid; PO Supplement: Ensure Pudding 1 can x 3 daily (will provide additional ~510 Kcal, ~12 gm Protein);             2. Encourage & assist Pt with meals; Monitor PO diet tolerance; Honor food preferences;            3. Suggest: Appetite stimulant too boot Pt's appetite/PO intake;           4. Monitor labs, standing weights, hydration status;

## 2018-08-30 NOTE — DIETITIAN INITIAL EVALUATION ADULT. - PERTINENT LABORATORY DATA
(8/30) WBC 13.23 H, H/H 11.2/35.0 L, Na 134 L, CCl 94 L, BUN 49 H, Creat 1.79 H;           (8/23) Albumin 3.5,  H

## 2018-08-30 NOTE — DIETITIAN INITIAL EVALUATION ADULT. - OTHER INFO
Pt seen for Length of stay. Pt 87 yo male with heard of hearing. Pt appears alert, oriented, but weak. Pt's daughter @ bed side participated in the interview. Per Pt his appetite poor for a while. Pt also stated he lost weight, but unable to quantify. Food preferences discussed. RDN offered PO supplement: Ensure Enlive, but Pt declined. Of note Pt passed Swallow Bedside Assessment Adult, SLP rec: Dysphagia 2 (Mechanical Soft) with Nectar Thick Liquids (8/27). No report of vomiting or diarrhea @ present. Plan for Pt to be discharged today. DNR status noted. RDN remains available.

## 2018-09-06 ENCOUNTER — TRANSCRIPTION ENCOUNTER (OUTPATIENT)
Age: 83
End: 2018-09-06

## 2018-09-11 ENCOUNTER — TRANSCRIPTION ENCOUNTER (OUTPATIENT)
Age: 83
End: 2018-09-11

## 2018-09-14 ENCOUNTER — MESSAGE (OUTPATIENT)
Age: 83
End: 2018-09-14

## 2018-09-24 RX ORDER — TAMSULOSIN HYDROCHLORIDE 0.4 MG/1
1 CAPSULE ORAL
Qty: 30 | Refills: 0 | OUTPATIENT
Start: 2018-09-24 | End: 2018-10-23

## 2018-09-24 RX ORDER — PANTOPRAZOLE SODIUM 20 MG/1
1 TABLET, DELAYED RELEASE ORAL
Qty: 30 | Refills: 0 | OUTPATIENT
Start: 2018-09-24 | End: 2018-10-23

## 2018-09-24 RX ORDER — HYDRALAZINE HCL 50 MG
1 TABLET ORAL
Qty: 90 | Refills: 0 | OUTPATIENT
Start: 2018-09-24 | End: 2018-10-23

## 2018-09-24 RX ORDER — HYDROXYZINE HCL 10 MG
1 TABLET ORAL
Qty: 30 | Refills: 0 | OUTPATIENT
Start: 2018-09-24 | End: 2018-10-23

## 2018-11-06 ENCOUNTER — APPOINTMENT (OUTPATIENT)
Dept: OTOLARYNGOLOGY | Facility: CLINIC | Age: 83
End: 2018-11-06

## 2019-09-02 PROBLEM — Z09 FOLLOW UP: Status: RESOLVED | Noted: 2018-05-15 | Resolved: 2019-09-02

## 2019-09-02 PROBLEM — Z09 FOLLOW UP: Status: ACTIVE | Noted: 2018-06-05

## 2021-04-03 NOTE — ED PROVIDER NOTE - PSH
Give AM labs now, Patient requests care clustered Monitor on tele. Metoprolol 5 mg IV push given as ordered. RN requesting speech and swallow. Pt states he needs soft diet. Pt states he is able to swallow water. Pt observed w wet cough after administering spoonful of water. S/P colon resection  2007

## 2021-08-16 NOTE — H&P ADULT - PROBLEM SELECTOR PROBLEM 3
Pt arrived in [facility] for wound check. Large area of ecchymosis left pectoral and left breast, slight circular spot mid sternum. Soft to touch. Minimal pocket swelling. Incision is well approximated no drainage and Dermabond present. No additional dressing applied. Pt to [facility] on Thursday 8/19/2021 1 week wound and device check to accommodate a Dr. Gunn appt. Pt also points out LE swelling, takes Lasix daily. Encouraged elevating legs and avoid salty foods. Db SOB.   
Hypertension

## 2021-12-08 NOTE — CHART NOTE - NSCHARTNOTEFT_GEN_A_CORE
Patient is medically cleared. The patient was observed in the department and remained calm and cooperative during their stay. We are awaiting psychiatric placement at this time. Spoke to primary team, patient is a direct hospice referral. Patient appears comfortable. Hospice referral made.

## 2022-07-10 NOTE — H&P PST ADULT - FAMILY HISTORY
unresponsive
Sibling  Still living? No  Family history of heart disease, Age at diagnosis: Age Unknown  Family history of cancer, Age at diagnosis: Age Unknown

## 2022-10-19 NOTE — PROGRESS NOTE ADULT - PROBLEM SELECTOR PLAN 1
MARYELLEN on CKD  scr. ~1.3-1.4 in 2017.   Pt with hx of long stand HTN   renal function stable,  worsen before likely sec to contrast study done 8/24  s/p zimmerman in place  on spironolactone at home, hold for now   continue to monitor bmp  phos level is ok  PTH is low MARYELLEN on CKD likely sec to contrast given on 8/24   baseline scr. ~1.3-1.4 in 2017.   Pt with hx of long stand HTN   renal function stable,   s/p zimmerman in place  on spironolactone at home, hold for now   continue to monitor bmp  phos level is ok  PTH is low done

## 2022-11-21 NOTE — ED PROVIDER NOTE - PSH
Requested Prescriptions     Pending Prescriptions Disp Refills    rosuvastatin (CRESTOR) 10 MG tablet [Pharmacy Med Name: ROSUVASTATIN CALCIUM 10 MG TAB] 30 tablet 3     Sig: TAKE ONE TABLET BY MOUTH DAILY     Last OV - 5/18/22  Next OV - 12/5/22  Last refill - 11/14/22  Last labs - 8/3/22
S/P colon resection  2007 with chemo

## 2023-07-09 NOTE — DISCHARGE NOTE ADULT - LAUNCH MEDICATION RECONCILIATION
<<-----Click here for Discharge Medication Review

## 2023-11-15 NOTE — DISCHARGE NOTE ADULT - PATIENT PORTAL LINK FT
Update History & Physical    The patient's History and Physical of October 24, 2023 was reviewed with the patient and I examined the patient. There was no change. The surgical site was confirmed by the patient and me. Plan: The risks, benefits, expected outcome, and alternative to the recommended procedure have been discussed with the patient. Patient understands and wants to proceed with the procedure.      Electronically signed by Laya James MD on 11/15/2023 at 12:25 PM “You can access the [facility] Patient Portal, offered by [facility], by registering with the following website: [email or web address]”

## 2025-01-06 NOTE — H&P PST ADULT - HEMATOLOGY/LYMPHATICS
Called and lm for pt relaying message.   
Caller: Patient    Doctor: Analisa    Reason for call: Patient is calling to find out if either of you do a procedure called facetomy. Please call patient to advise at your convenience. Thank you.    Call back#: [phone]  
negative